# Patient Record
Sex: FEMALE | Employment: UNEMPLOYED | URBAN - METROPOLITAN AREA
[De-identification: names, ages, dates, MRNs, and addresses within clinical notes are randomized per-mention and may not be internally consistent; named-entity substitution may affect disease eponyms.]

---

## 2017-11-18 ENCOUNTER — HOSPITAL ENCOUNTER (OUTPATIENT)
Dept: CT IMAGING | Age: 57
Discharge: HOME | End: 2017-11-18
Attending: SPECIALIST
Payer: COMMERCIAL

## 2017-11-18 DIAGNOSIS — A04.9 BACTERIAL ENTERITIS: ICD-10-CM

## 2017-11-18 DIAGNOSIS — Z86.010 HX OF COLONIC POLYPS: ICD-10-CM

## 2017-11-18 DIAGNOSIS — R14.0 BLOATING SYMPTOM: ICD-10-CM

## 2017-11-18 DIAGNOSIS — K59.00 CONSTIPATION: ICD-10-CM

## 2017-11-18 DIAGNOSIS — R10.31 ABDOMINAL PAIN, RIGHT LOWER QUADRANT: ICD-10-CM

## 2017-11-18 PROCEDURE — 74011636320 HC RX REV CODE- 636/320: Performed by: SPECIALIST

## 2017-11-18 PROCEDURE — 74177 CT ABD & PELVIS W/CONTRAST: CPT

## 2017-11-18 PROCEDURE — 74011000258 HC RX REV CODE- 258: Performed by: SPECIALIST

## 2017-11-18 RX ORDER — SODIUM CHLORIDE 0.9 % (FLUSH) 0.9 %
10 SYRINGE (ML) INJECTION
Status: COMPLETED | OUTPATIENT
Start: 2017-11-18 | End: 2017-11-18

## 2017-11-18 RX ADMIN — Medication 10 ML: at 09:32

## 2017-11-18 RX ADMIN — IOPAMIDOL 100 ML: 755 INJECTION, SOLUTION INTRAVENOUS at 09:32

## 2017-11-18 RX ADMIN — SODIUM CHLORIDE 100 ML: 900 INJECTION, SOLUTION INTRAVENOUS at 09:32

## 2018-01-29 ENCOUNTER — HOSPITAL ENCOUNTER (EMERGENCY)
Age: 58
Discharge: HOME | End: 2018-01-29
Attending: FAMILY MEDICINE

## 2018-01-29 VITALS
BODY MASS INDEX: 26.87 KG/M2 | OXYGEN SATURATION: 99 % | WEIGHT: 146 LBS | RESPIRATION RATE: 20 BRPM | HEIGHT: 62 IN | SYSTOLIC BLOOD PRESSURE: 101 MMHG | HEART RATE: 102 BPM | DIASTOLIC BLOOD PRESSURE: 60 MMHG | TEMPERATURE: 99.4 F

## 2018-01-29 DIAGNOSIS — J06.9 VIRAL URI WITH COUGH: Primary | ICD-10-CM

## 2018-01-29 LAB
INFLUENZA A AG (POC): NORMAL
INFLUENZA AG (POC) NEGATIVE CTRL.: NORMAL
INFLUENZA AG (POC) POSITIVE CTRL.: NORMAL
INFLUENZA B AG (POC): NORMAL
LOT NUMBER POC: NORMAL

## 2018-01-29 RX ORDER — PREDNISONE 5 MG/1
TABLET ORAL
Qty: 21 TAB | Refills: 0 | Status: SHIPPED | OUTPATIENT
Start: 2018-01-29 | End: 2019-12-21

## 2018-01-29 RX ORDER — ROSUVASTATIN CALCIUM 20 MG/1
20 TABLET, COATED ORAL
COMMUNITY
End: 2019-12-21

## 2018-01-29 RX ORDER — PROMETHAZINE HYDROCHLORIDE AND DEXTROMETHORPHAN HYDROBROMIDE 6.25; 15 MG/5ML; MG/5ML
5 SYRUP ORAL
Qty: 100 ML | Refills: 0 | Status: SHIPPED | OUTPATIENT
Start: 2018-01-29 | End: 2019-12-21

## 2018-01-29 RX ORDER — BENZONATATE 200 MG/1
200 CAPSULE ORAL
Qty: 21 CAP | Refills: 0 | Status: SHIPPED | OUTPATIENT
Start: 2018-01-29 | End: 2018-02-05

## 2018-01-29 NOTE — UC PROVIDER NOTE
Patient is a 62 y.o. female presenting with cough. The history is provided by the patient. Cough   This is a new problem. The current episode started more than 2 days ago. The problem occurs every few minutes. The problem has not changed since onset. The cough is non-productive. There has been no fever. Pertinent negatives include no chest pain, no chills, no myalgias, no shortness of breath and no wheezing. She has tried nothing for the symptoms. She is not a smoker. Her past medical history is significant for bronchitis. Her past medical history does not include asthma. History reviewed. No pertinent past medical history. Past Surgical History:   Procedure Laterality Date    HX  SECTION      x3    HX GYN      ablation    HX TONSILLECTOMY           History reviewed. No pertinent family history. Social History     Social History    Marital status: SINGLE     Spouse name: N/A    Number of children: N/A    Years of education: N/A     Occupational History    Not on file. Social History Main Topics    Smoking status: Former Smoker    Smokeless tobacco: Never Used      Comment: quit Aug 2014    Alcohol use Yes      Comment: rare    Drug use: Not on file    Sexual activity: Not on file     Other Topics Concern    Not on file     Social History Narrative                ALLERGIES: Venom-honey bee; Ciprofloxacin; Penicillins; and Sulfa (sulfonamide antibiotics)    Review of Systems   Constitutional: Negative for chills. Respiratory: Positive for cough. Negative for shortness of breath and wheezing. Cardiovascular: Negative for chest pain. Musculoskeletal: Negative for myalgias. All other systems reviewed and are negative. Vitals:    18 0834   BP: 101/60   Pulse: (!) 102   Resp: 20   Temp: 99.4 °F (37.4 °C)   SpO2: 99%   Weight: 66.2 kg (146 lb)   Height: 5' 2\" (1.575 m)       Physical Exam   Constitutional: No distress.    HENT:   Right Ear: Tympanic membrane and ear canal normal.   Left Ear: Tympanic membrane and ear canal normal.   Nose: Nose normal.   Mouth/Throat: No oropharyngeal exudate, posterior oropharyngeal edema or posterior oropharyngeal erythema. Eyes: Conjunctivae are normal. Right eye exhibits no discharge. Left eye exhibits no discharge. Neck: Neck supple. Pulmonary/Chest: Effort normal and breath sounds normal. No respiratory distress. She has no wheezes. She has no rales. Lymphadenopathy:     She has no cervical adenopathy. Skin: No rash noted. Nursing note and vitals reviewed. MDM     Differential Diagnosis; Clinical Impression; Plan:     CLINICAL IMPRESSION:  Viral URI with cough  (primary encounter diagnosis)      DDX    Plan:    Rapid flu- negative  Use Mucinex DM twice a day  Tessalon- prednisone- promethazine DM   Amount and/or Complexity of Data Reviewed:   Clinical lab tests:  Ordered and reviewed   Review and summarize past medical records:  Yes  Risk of Significant Complications, Morbidity, and/or Mortality:   Presenting problems: Moderate  Diagnostic procedures: Moderate  Management options:   Moderate  Progress:   Patient progress:  Stable      Procedures

## 2018-01-29 NOTE — DISCHARGE INSTRUCTIONS
Upper Respiratory Infection (Cold): Care Instructions  Your Care Instructions    An upper respiratory infection, or URI, is an infection of the nose, sinuses, or throat. URIs are spread by coughs, sneezes, and direct contact. The common cold is the most frequent kind of URI. The flu and sinus infections are other kinds of URIs. Almost all URIs are caused by viruses. Antibiotics won't cure them. But you can treat most infections with home care. This may include drinking lots of fluids and taking over-the-counter pain medicine. You will probably feel better in 4 to 10 days. The doctor has checked you carefully, but problems can develop later. If you notice any problems or new symptoms, get medical treatment right away. Follow-up care is a key part of your treatment and safety. Be sure to make and go to all appointments, and call your doctor if you are having problems. It's also a good idea to know your test results and keep a list of the medicines you take. How can you care for yourself at home? · To prevent dehydration, drink plenty of fluids, enough so that your urine is light yellow or clear like water. Choose water and other caffeine-free clear liquids until you feel better. If you have kidney, heart, or liver disease and have to limit fluids, talk with your doctor before you increase the amount of fluids you drink. · Take an over-the-counter pain medicine, such as acetaminophen (Tylenol), ibuprofen (Advil, Motrin), or naproxen (Aleve). Read and follow all instructions on the label. · Before you use cough and cold medicines, check the label. These medicines may not be safe for young children or for people with certain health problems. · Be careful when taking over-the-counter cold or flu medicines and Tylenol at the same time. Many of these medicines have acetaminophen, which is Tylenol. Read the labels to make sure that you are not taking more than the recommended dose.  Too much acetaminophen (Tylenol) can be harmful. · Get plenty of rest.  · Do not smoke or allow others to smoke around you. If you need help quitting, talk to your doctor about stop-smoking programs and medicines. These can increase your chances of quitting for good. When should you call for help? Call 911 anytime you think you may need emergency care. For example, call if:  ? · You have severe trouble breathing. ?Call your doctor now or seek immediate medical care if:  ? · You seem to be getting much sicker. ? · You have new or worse trouble breathing. ? · You have a new or higher fever. ? · You have a new rash. ? Watch closely for changes in your health, and be sure to contact your doctor if:  ? · You have a new symptom, such as a sore throat, an earache, or sinus pain. ? · You cough more deeply or more often, especially if you notice more mucus or a change in the color of your mucus. ? · You do not get better as expected. Where can you learn more? Go to http://arabella-akila.info/. Enter F529 in the search box to learn more about \"Upper Respiratory Infection (Cold): Care Instructions. \"  Current as of: May 12, 2017  Content Version: 11.4  © 9418-1045 [employer]. Care instructions adapted under license by [employer] (which disclaims liability or warranty for this information). If you have questions about a medical condition or this instruction, always ask your healthcare professional. [place] [address] any warranty or liability for your use of this information.

## 2019-09-05 ENCOUNTER — HOSPITAL ENCOUNTER (OUTPATIENT)
Dept: GENERAL RADIOLOGY | Age: 59
Discharge: HOME | End: 2019-09-05
Attending: SPECIALIST
Payer: COMMERCIAL

## 2019-09-05 DIAGNOSIS — R14.0 BLOATING SYMPTOM: ICD-10-CM

## 2019-09-05 DIAGNOSIS — K59.00 CONSTIPATION: ICD-10-CM

## 2019-09-05 PROCEDURE — 74241 XR UPPER GI W KUB/ BA SWALLOW: CPT

## 2019-09-11 ENCOUNTER — HOSPITAL ENCOUNTER (OUTPATIENT)
Dept: NUCLEAR MEDICINE | Age: 59
Discharge: HOME | End: 2019-09-11
Attending: SPECIALIST
Payer: COMMERCIAL

## 2019-09-11 DIAGNOSIS — R14.0 BLOATING SYMPTOM: ICD-10-CM

## 2019-09-11 DIAGNOSIS — K59.00 CONSTIPATION: ICD-10-CM

## 2019-09-11 PROCEDURE — 78264 GASTRIC EMPTYING IMG STUDY: CPT

## 2019-12-21 ENCOUNTER — OFFICE VISIT (OUTPATIENT)
Dept: URGENT CARE | Age: 59
End: 2019-12-21

## 2019-12-21 VITALS
HEART RATE: 100 BPM | OXYGEN SATURATION: 97 % | BODY MASS INDEX: 28.16 KG/M2 | TEMPERATURE: 98.8 F | DIASTOLIC BLOOD PRESSURE: 59 MMHG | WEIGHT: 153 LBS | SYSTOLIC BLOOD PRESSURE: 126 MMHG | HEIGHT: 62 IN | RESPIRATION RATE: 18 BRPM

## 2019-12-21 DIAGNOSIS — R50.9 FEVER, UNSPECIFIED FEVER CAUSE: ICD-10-CM

## 2019-12-21 DIAGNOSIS — B96.89 BACTERIAL SINUSITIS: ICD-10-CM

## 2019-12-21 DIAGNOSIS — J32.9 BACTERIAL SINUSITIS: ICD-10-CM

## 2019-12-21 LAB
FLUAV+FLUBV AG NOSE QL IA.RAPID: NEGATIVE POS/NEG
FLUAV+FLUBV AG NOSE QL IA.RAPID: NEGATIVE POS/NEG
VALID INTERNAL CONTROL?: YES

## 2019-12-21 RX ORDER — AZITHROMYCIN 250 MG/1
TABLET, FILM COATED ORAL
Qty: 6 TAB | Refills: 0 | Status: SHIPPED | OUTPATIENT
Start: 2019-12-21 | End: 2019-12-26

## 2019-12-21 RX ORDER — LEVOTHYROXINE SODIUM 25 UG/1
TABLET ORAL
COMMUNITY
End: 2021-05-14

## 2019-12-21 NOTE — PATIENT INSTRUCTIONS

## 2019-12-21 NOTE — PROGRESS NOTES
The history is provided by the patient. Cold Symptoms   The history is provided by the patient. This is a new problem. The current episode started more than 1 week ago. The problem occurs constantly. The problem has not changed since onset. The cough is non-productive. There has been no fever. Fever duration: Patient states earlier part of the week she had low grade temperature  Associated symptoms include headaches and rhinorrhea. Pertinent negatives include no chest pain, no chills, no eye redness, no ear pain, no sore throat, no shortness of breath, no wheezing, no nausea and no vomiting. Ear congestion: bilateral. She has tried cough syrup for the symptoms. The treatment provided no relief. History reviewed. No pertinent past medical history. Past Surgical History:   Procedure Laterality Date    HX  SECTION      x3    HX GYN      ablation    HX TONSILLECTOMY           History reviewed. No pertinent family history.      Social History     Socioeconomic History    Marital status:      Spouse name: Not on file    Number of children: Not on file    Years of education: Not on file    Highest education level: Not on file   Occupational History    Not on file   Social Needs    Financial resource strain: Not on file    Food insecurity:     Worry: Not on file     Inability: Not on file    Transportation needs:     Medical: Not on file     Non-medical: Not on file   Tobacco Use    Smoking status: Former Smoker    Smokeless tobacco: Never Used    Tobacco comment: quit Aug 2014   Substance and Sexual Activity    Alcohol use: Yes     Comment: rare    Drug use: Not on file    Sexual activity: Not on file   Lifestyle    Physical activity:     Days per week: Not on file     Minutes per session: Not on file    Stress: Not on file   Relationships    Social connections:     Talks on phone: Not on file     Gets together: Not on file     Attends Caodaism service: Not on file     Active member of club or organization: Not on file     Attends meetings of clubs or organizations: Not on file     Relationship status: Not on file    Intimate partner violence:     Fear of current or ex partner: Not on file     Emotionally abused: Not on file     Physically abused: Not on file     Forced sexual activity: Not on file   Other Topics Concern    Not on file   Social History Narrative    Not on file                ALLERGIES: Venom-honey bee; Ciprofloxacin; Penicillins; and Sulfa (sulfonamide antibiotics)    Review of Systems   Constitutional: Positive for fatigue. Negative for chills and fever. HENT: Positive for congestion, ear discharge, postnasal drip, rhinorrhea and sinus pressure. Negative for ear pain, hearing loss, sinus pain, sore throat, trouble swallowing and voice change. Eyes: Negative. Negative for redness. Respiratory: Negative for cough, shortness of breath and wheezing. Cardiovascular: Negative for chest pain. Gastrointestinal: Negative for nausea and vomiting. Genitourinary: Negative. Musculoskeletal: Negative. Skin: Negative. Neurological: Positive for headaches. Negative for dizziness, syncope, weakness and light-headedness. Vitals:    12/21/19 0947   BP: 126/59   Pulse: 100   Resp: 18   Temp: 98.8 °F (37.1 °C)   SpO2: 97%   Weight: 153 lb (69.4 kg)   Height: 5' 2\" (1.575 m)       Physical Exam  Constitutional:       Appearance: Normal appearance. She is well-developed. HENT:      Head:      Comments: Maxillary and ethmoid sinus pressure upon palpation     Right Ear: Tympanic membrane normal.      Left Ear: Tympanic membrane normal.      Nose: Congestion and rhinorrhea present. Mouth/Throat:      Comments: Post nasal drainage  Eyes:      Conjunctiva/sclera: Conjunctivae normal.      Pupils: Pupils are equal, round, and reactive to light. Neck:      Musculoskeletal: Normal range of motion.    Cardiovascular:      Rate and Rhythm: Normal rate and regular rhythm. Heart sounds: Normal heart sounds. Pulmonary:      Effort: Pulmonary effort is normal.      Breath sounds: Normal breath sounds. Musculoskeletal: Normal range of motion. Lymphadenopathy:      Cervical: No cervical adenopathy. Skin:     General: Skin is warm and dry. Neurological:      Mental Status: She is alert and oriented to person, place, and time. MDM     Differential Diagnosis; Clinical Impression; Plan:     (J32.9,  B96.89) Bacterial sinusitis  (R50.9) Fever, unspecified fever cause  Orders Placed This Encounter      azithromycin (ZITHROMAX) 250 mg tablet          Sig: Take 2 tablets today, then take 1 tablet daily          Dispense:  6 Tab          Refill:  0    Advised patient to use OTC nasal saline spray, use a decongestant, a daily antihistamine and increase water intake. The patients condition was discussed with the patient and they understand. The patient is to follow up with PCP. If signs and symptoms become worse the pt is to go to the ER. The patient is to take medications as prescribed. AVS given with patient instructions upon discharge.                   Procedures

## 2019-12-31 ENCOUNTER — OFFICE VISIT (OUTPATIENT)
Dept: URGENT CARE | Age: 59
End: 2019-12-31

## 2019-12-31 VITALS
HEIGHT: 62 IN | HEART RATE: 95 BPM | SYSTOLIC BLOOD PRESSURE: 127 MMHG | RESPIRATION RATE: 18 BRPM | WEIGHT: 153.4 LBS | DIASTOLIC BLOOD PRESSURE: 60 MMHG | OXYGEN SATURATION: 97 % | BODY MASS INDEX: 28.23 KG/M2 | TEMPERATURE: 98.5 F

## 2019-12-31 DIAGNOSIS — B96.89 BACTERIAL SINUSITIS: ICD-10-CM

## 2019-12-31 DIAGNOSIS — R05.9 COUGH: ICD-10-CM

## 2019-12-31 DIAGNOSIS — R50.9 FEVER, UNSPECIFIED FEVER CAUSE: ICD-10-CM

## 2019-12-31 DIAGNOSIS — J32.9 BACTERIAL SINUSITIS: ICD-10-CM

## 2019-12-31 RX ORDER — DOXYCYCLINE 100 MG/1
100 TABLET ORAL 2 TIMES DAILY
Qty: 14 TAB | Refills: 0 | Status: SHIPPED | OUTPATIENT
Start: 2019-12-31 | End: 2020-01-07

## 2019-12-31 RX ORDER — HYDROCODONE POLISTIREX AND CHLORPHENIRAMINE POLISTIREX 10; 8 MG/5ML; MG/5ML
1 SUSPENSION, EXTENDED RELEASE ORAL
Qty: 50 ML | Refills: 0 | Status: SHIPPED | OUTPATIENT
Start: 2019-12-31 | End: 2020-01-05

## 2019-12-31 NOTE — PROGRESS NOTES
The history is provided by the patient. Cold Symptoms   The history is provided by the patient. This is a recurrent problem. The current episode started more than 1 week ago. The problem occurs constantly. The problem has not changed since onset. The cough is non-productive. There has been no fever. Associated symptoms include ear pain, headaches and rhinorrhea. Pertinent negatives include no chest pain, no chills, no sore throat, no shortness of breath, no wheezing, no nausea and no vomiting. She has tried prescription drugs for the symptoms. The treatment provided no relief. [facility]. Patient states symptoms got better for a day and symptoms came back even worst.  Patient denies chest pain, SOB or dizziness. History reviewed. No pertinent past medical history. Past Surgical History:   Procedure Laterality Date    HX  SECTION      x3    HX GYN      ablation    HX TONSILLECTOMY           History reviewed. No pertinent family history.      Social History     Socioeconomic History    Marital status:      Spouse name: Not on file    Number of children: Not on file    Years of education: Not on file    Highest education level: Not on file   Occupational History    Not on file   Social Needs    Financial resource strain: Not on file    Food insecurity:     Worry: Not on file     Inability: Not on file    Transportation needs:     Medical: Not on file     Non-medical: Not on file   Tobacco Use    Smoking status: Former Smoker    Smokeless tobacco: Never Used    Tobacco comment: quit Aug 2014   Substance and Sexual Activity    Alcohol use: Yes     Comment: rare    Drug use: Not on file    Sexual activity: Not on file   Lifestyle    Physical activity:     Days per week: Not on file     Minutes per session: Not on file    Stress: Not on file   Relationships    Social connections:     Talks on phone: Not on file     Gets together: Not on file     Attends Sabianism service: Not on file     Active member of club or organization: Not on file     Attends meetings of clubs or organizations: Not on file     Relationship status: Not on file    Intimate partner violence:     Fear of current or ex partner: Not on file     Emotionally abused: Not on file     Physically abused: Not on file     Forced sexual activity: Not on file   Other Topics Concern    Not on file   Social History Narrative    Not on file                ALLERGIES: Venom-honey bee; Ciprofloxacin; Penicillins; and Sulfa (sulfonamide antibiotics)    Review of Systems   Constitutional: Negative for chills, fatigue and fever. HENT: Positive for congestion, ear discharge, ear pain, postnasal drip, rhinorrhea and sinus pressure. Negative for hearing loss, sinus pain, sore throat, trouble swallowing and voice change. Eyes: Negative. Respiratory: Positive for cough. Negative for chest tightness, shortness of breath and wheezing. Cardiovascular: Negative for chest pain. Gastrointestinal: Negative for nausea and vomiting. Genitourinary: Negative. Musculoskeletal: Negative. Skin: Negative. Neurological: Positive for headaches. Negative for dizziness, syncope, weakness and light-headedness. Vitals:    12/31/19 1131   BP: 127/60   Pulse: 95   Resp: 18   Temp: 98.5 °F (36.9 °C)   SpO2: 97%   Weight: 153 lb 6.4 oz (69.6 kg)   Height: 5' 2\" (1.575 m)       Physical Exam  Constitutional:       Appearance: She is well-developed. HENT:      Head:      Comments: Maxillary and ethmoid sinus tenderness upon palpation     Right Ear: Tympanic membrane normal.      Left Ear: Tympanic membrane normal.      Nose: Congestion and rhinorrhea present. Mouth/Throat:      Pharynx: No oropharyngeal exudate or posterior oropharyngeal erythema.       Comments: Post nasal drainage  Eyes:      Conjunctiva/sclera: Conjunctivae normal.      Pupils: Pupils are equal, round, and reactive to light. Neck:      Musculoskeletal: Normal range of motion. Cardiovascular:      Rate and Rhythm: Normal rate and regular rhythm. Heart sounds: Normal heart sounds. Pulmonary:      Effort: Pulmonary effort is normal.      Breath sounds: Normal breath sounds. Musculoskeletal: Normal range of motion. Lymphadenopathy:      Cervical: No cervical adenopathy. Skin:     General: Skin is warm and dry. Neurological:      Mental Status: She is alert and oriented to person, place, and time. MDM     Differential Diagnosis; Clinical Impression; Plan:     (J32.9,  B96.89) Bacterial sinusitis  (R50.9) Fever, unspecified fever cause  (R05) Cough  Orders Placed This Encounter      doxycycline (ADOXA) 100 mg tablet          Sig: Take 1 Tab by mouth two (2) times a day for 7 days. Dispense:  14 Tab          Refill:  0      chlorpheniramine-HYDROcodone (TUSSIONEX) 10-8 mg/5 mL suspension          Sig: Take 5 mL by mouth every twelve (12) hours as needed for Cough or Congestion for up to 5 days. Max Daily Amount: 10 mL. Dispense:  50 mL          Refill:  0    The patients condition was discussed with the patient and they understand. The patient is to follow up with PCP. If signs and symptoms become worse the pt is to go to the ER. The patient is to take medications as prescribed. AVS given with patient instructions upon discharge.                   Procedures

## 2019-12-31 NOTE — PATIENT INSTRUCTIONS
Sinusitis: Care Instructions  Your Care Instructions    Sinusitis is an infection of the lining of the sinus cavities in your head. Sinusitis often follows a cold. It causes pain and pressure in your head and face. In most cases, sinusitis gets better on its own in 1 to 2 weeks. But some mild symptoms may last for several weeks. Sometimes antibiotics are needed. Follow-up care is a key part of your treatment and safety. Be sure to make and go to all appointments, and call your doctor if you are having problems. It's also a good idea to know your test results and keep a list of the medicines you take. How can you care for yourself at home? · Take an over-the-counter pain medicine, such as acetaminophen (Tylenol), ibuprofen (Advil, Motrin), or naproxen (Aleve). Read and follow all instructions on the label. · If the doctor prescribed antibiotics, take them as directed. Do not stop taking them just because you feel better. You need to take the full course of antibiotics. · Be careful when taking over-the-counter cold or flu medicines and Tylenol at the same time. Many of these medicines have acetaminophen, which is Tylenol. Read the labels to make sure that you are not taking more than the recommended dose. Too much acetaminophen (Tylenol) can be harmful. · Breathe warm, moist air from a steamy shower, a hot bath, or a sink filled with hot water. Avoid cold, dry air. Using a humidifier in your home may help. Follow the directions for cleaning the machine. · Use saline (saltwater) nasal washes to help keep your nasal passages open and wash out mucus and bacteria. You can buy saline nose drops at a grocery store or drugstore. Or you can make your own at home by adding 1 teaspoon of salt and 1 teaspoon of baking soda to 2 cups of distilled water. If you make your own, fill a bulb syringe with the solution, insert the tip into your nostril, and squeeze gently. Elisa Wanda your nose.   · Put a hot, wet towel or a warm gel pack on your face 3 or 4 times a day for 5 to 10 minutes each time. · Try a decongestant nasal spray like oxymetazoline (Afrin). Do not use it for more than 3 days in a row. Using it for more than 3 days can make your congestion worse. When should you call for help? Call your doctor now or seek immediate medical care if:    · You have new or worse swelling or redness in your face or around your eyes.     · You have a new or higher fever.    Watch closely for changes in your health, and be sure to contact your doctor if:    · You have new or worse facial pain.     · The mucus from your nose becomes thicker (like pus) or has new blood in it.     · You are not getting better as expected. Where can you learn more? Go to http://arabella-akila.info/. Enter K965 in the search box to learn more about \"Sinusitis: Care Instructions. \"  Current as of: October 21, 2018  Content Version: 12.2  © 6360-8112 [employer]. Care instructions adapted under license by [employer] (which disclaims liability or warranty for this information). If you have questions about a medical condition or this instruction, always ask your healthcare professional. [place] [address] any warranty or liability for your use of this information.

## 2021-05-14 ENCOUNTER — OFFICE VISIT (OUTPATIENT)
Dept: ENDOCRINOLOGY | Age: 61
End: 2021-05-14
Payer: COMMERCIAL

## 2021-05-14 VITALS
SYSTOLIC BLOOD PRESSURE: 118 MMHG | BODY MASS INDEX: 29.08 KG/M2 | DIASTOLIC BLOOD PRESSURE: 75 MMHG | HEART RATE: 78 BPM | WEIGHT: 159 LBS

## 2021-05-14 DIAGNOSIS — E03.9 ACQUIRED HYPOTHYROIDISM: Primary | ICD-10-CM

## 2021-05-14 PROCEDURE — 99204 OFFICE O/P NEW MOD 45 MIN: CPT | Performed by: INTERNAL MEDICINE

## 2021-05-14 RX ORDER — LEVOTHYROXINE, LIOTHYRONINE 19; 4.5 UG/1; UG/1
TABLET ORAL
COMMUNITY
Start: 2021-04-20 | End: 2021-05-15 | Stop reason: ALTCHOICE

## 2021-05-14 RX ORDER — ALIROCUMAB 150 MG/ML
INJECTION, SOLUTION SUBCUTANEOUS EVERY 2 WEEKS
COMMUNITY
Start: 2021-05-06

## 2021-05-14 NOTE — PROGRESS NOTES
Chief Complaint   Patient presents with    Thyroid Problem     History of Present Illness: Ruby Spears is a 64 y.o. female who is a new patient for thyroid. Was having fatigue and difficultly losing weight back in 2018 and was checked by her PCP and was found to have hypothyroidism. Was started on levothyroxine and also took brand synthroid as initial treatments but continued to have fatigue on these and decided to see an endocrinologist and went to Dr. Jaime Sheets in 2019 and was changed to unithroid initially but still had fatigue so asked to change to natural thyroid hormone and initially was on [facility] thyroid but this was recalled in 10/20 and then changed to NP thyroid at that time. Had been taking 30 mg 1 tab 5x/week and 2 tabs 2x/week until her labs were drawn on 21 that showed her TSH was 4.59 and her dose was increased to 1 tab 4x/week and 2 tabs 3x/week. Takes the NP thyroid first thing in the morning with just water and waits at least 30 minutes before eating and coffee. Not on any vitamins or PPI that would interfere. Doesn't miss her doses. Is starting to have a little more energy on this dose but does still have some fatigue intermittently. Can alternate between regular and some constipation. Tends to stay hot most of the time. No hair loss. Has had some brittle nails that are improving. Weight has been stable recently. Doesn't recall being told she had Hashimoto's. No anterior neck pain or swelling. Does have some intermittent moodiness. Does see Dr. Sheyla Whitaker for her high cholesterol and has tried and failed 3 statins and she prescribed praluent and is tolerating this.        Past Medical History:   Diagnosis Date    Acquired hypothyroidism 2018    High cholesterol     Sleep apnea     on CPAP     Past Surgical History:   Procedure Laterality Date    HX  SECTION      x3    HX GYN      uterine ablation    HX TONSILLECTOMY       Current Outpatient Medications   Medication Shani Corrales NP Thyroid 30 mg tablet 1 tab 4x/week and 2 tabs 3x/week    Praluent Pen 150 mg/mL injector pen Once every 2 weeks. No current facility-administered medications for this visit.       Allergies   Allergen Reactions    Venom-Honey Bee Anaphylaxis    Ciprofloxacin Hives, Rash and Itching    Penicillins Hives, Rash and Itching    Statins-Hmg-Coa Reductase Inhibitors Myalgia     With lovastatin and crestor and simvastatin    Sulfa (Sulfonamide Antibiotics) Hives, Rash and Itching     Family History   Problem Relation Age of Onset    Thyroid Disease Mother     Pneumonia Father         and had c-diff    Thyroid Disease Sister      Social History     Socioeconomic History    Marital status:      Spouse name: Not on file    Number of children: Not on file    Years of education: Not on file    Highest education level: Not on file   Occupational History    Not on file   Social Needs    Financial resource strain: Not on file    Food insecurity     Worry: Not on file     Inability: Not on file    Transportation needs     Medical: Not on file     Non-medical: Not on file   Tobacco Use    Smoking status: Former Smoker     Packs/day: 0.50     Years: 20.00     Pack years: 10.00     Quit date: 2014     Years since quittin.7    Smokeless tobacco: Never Used   Substance and Sexual Activity    Alcohol use: Not Currently    Drug use: Never    Sexual activity: Not on file   Lifestyle    Physical activity     Days per week: Not on file     Minutes per session: Not on file    Stress: Not on file   Relationships    Social connections     Talks on phone: Not on file     Gets together: Not on file     Attends Denominational service: Not on file     Active member of club or organization: Not on file     Attends meetings of clubs or organizations: Not on file     Relationship status: Not on file    Intimate partner violence     Fear of current or ex partner: Not on file     Emotionally abused: Not on file     Physically abused: Not on file     Forced sexual activity: Not on file   Other Topics Concern    Not on file   Social History Narrative    Lives in [place] with  from 2nd marriage. Has 3 sons from her 1st marriage. Works an  for [employer]. Likes to garden and spend time with her grandkids (has 6 of them by her family and 2 by her current 's). Review of Systems: per HPI    Physical Examination:  Blood pressure 118/75, pulse 78, weight 159 lb (72.1 kg). - General: pleasant, no distress, good eye contact  - HEENT: no exopthalmos, no periorbital edema, no scleral/conjunctival injection, EOMI, no lid lag or stare  - Neck: supple, small rubbery goiter without nodules, no masses, lymph nodes, or carotid bruits, no supraclavicular or dorsocervical fat pads  - Cardiovascular: regular, normal rate, normal S1 and S2, no murmurs/rubs/gallops   - Respiratory: clear to auscultation bilaterally  - Gastrointestinal: soft, nontender, nondistended, no masses, no hepatosplenomegaly  - Musculoskeletal: no proximal muscle weakness in upper or lower extremities  - Integumentary: no acanthosis nigricans, no abdominal striae, no rashes, no edema  - Neurological: reflexes 2+ at biceps, no tremor  - Psychiatric: normal mood and affect    Data Reviewed:   - labs as above    Assessment/Plan:   1. Acquired hypothyroidism: Was having fatigue and difficultly losing weight back in 2018 and was checked by her PCP and was found to have hypothyroidism. Was started on levothyroxine and also took brand synthroid as initial treatments but continued to have fatigue on these and decided to see an endocrinologist and went to Dr. Lisa Howell in 2019 and was changed to unithroid initially but still had fatigue so asked to change to natural thyroid hormone and initially was on [facility] thyroid but this was recalled in 10/20 and then changed to NP thyroid at that time.   Had been taking 30 mg 1 tab 5x/week and 2 tabs 2x/week until her labs were drawn on 4/12/21 that showed her TSH was 4.59 and her dose was increased to 1 tab 4x/week and 2 tabs 3x/week. - cont NP thyroid 30 mg 1 tab 4x/week and 2 tabs 3x/week until labs are back  - check TSH, free T4, and total T3 today, in 2 months and prior to next visit  - check thyroid antibody panel today to screen for hashimoto's        Patient Instructions   1) Please call [phone] to reset your [employer] password if you can't get in so I can send you your results through the portal.    2) I will repeat your thyroid tests to see if you need a change in your dose of NP thyroid and I will check you for Hashimoto's with thyroid antibody levels. 3) I put an order directly into the [email or web address] system to repeat your labs in the 1-2 weeks prior to your next visit so just ask for the order under my name and you will receive a courtesy reminder through [employer] to have these drawn. Follow-up and Dispositions    · Return 11/19/21 at 11:50am.             Copy sent to:  Christina Mejia NP (Nurse Practitioner)  Chula Fernando MD (Cardiology)    Lab follow up: 5/15/21  Component      Latest Ref Rng & Units 5/14/2021 5/14/2021 5/14/2021 5/14/2021          12:24 PM 12:24 PM 12:24 PM 12:24 PM   Thyroid peroxidase Ab      0 - 34 IU/mL 254 (H)      Thyroglobulin Ab      0.0 - 0.9 IU/mL 9.8 (H)      T4, Free      0.82 - 1.77 ng/dL    0.88   TSH      0.450 - 4.500 uIU/mL   3.220    T3, total      71 - 180 ng/dL  113       Sent her the following message through [employer]:  TSH is a thyroid test.  Your level is 3.22 which is normal but still above goal of 0.45-2.0. This test goes opposite of your thyroid dose and suggests your dose of NP thyroid is not enough.   I will increase your dose to 60 mg tabs but have you take 1 tab 6 days a week and skip one day a week which is the equivalent of 51 mg/day up from your current dose of 43 mg/day and have sent a new prescription to your local pharmacy.  -------------------------------------------------------------------------------------------------------------------  Your thyroid peroxidase (TPO) antibody and thyroglobulin antibody levels were both elevated which indicates that you do have an autoimmune thyroid disease called Hashimoto's disease as the cause of your hypothyroidism and since this condition can be genetic, you should be sure to let your children know that they could be at risk of a thyroid condition in the future.  -------------------------------------------------------------------------------------------------------------------  I put an order directly into the [employer] system to repeat your labs in 6 weeks so just ask for the order under my name and you will receive a courtesy reminder through [employer] to have these drawn and I'll be in touch with the results. Lab follow up: 7/2/21  Component      Latest Ref Rng & Units 7/1/2021 7/1/2021 7/1/2021           9:23 AM  9:23 AM  9:23 AM   T4, Free      0.82 - 1.77 ng/dL   0.89   TSH      0.450 - 4.500 uIU/mL  2.820    T3, total      71 - 180 ng/dL 179       Sent her the following message through [employer]:  TSH is a thyroid test.  Your level is 2.82 which is normal and down from 3.22 but still above goal of 0.45-2.0. This test goes opposite of your thyroid dose and suggests your dose of NP thyroid is not enough or you have missed doses in the past 6 weeks. Please confirm you are taking 60 mg 6 days/week and whether you have missed any doses and I'll determine a plan. Addendum: 7/2/21 9:48 AM    We had the following mychart exchange:    Ok. Then let's increase your dose to 1 tab 7 days a week. I'm not concerned that your T3 is higher as this should happen when you increase your dose of NP thyroid and you also had your labs drawn earlier in the day this time which will cause a higher T3 level as the level in the blood is higher the closer the lab draw is to taking the pill.   I updated your prescription at the pharmacy with the new dose. I put an order directly into the [employer] system to repeat your labs in 6 weeks so just ask for the order under my name and you will receive a courtesy reminder through [employer] to have these drawn and I'll be in touch with the results.    ===View-only below this line===      ----- Message -----       From:Ayanna Guerra       Sent:7/2/2021  8:57 AM EDT         To:Sj Sanchez MD    Subject:RE: lab results    Hello, I am taking the NP Thyroid as prescribed and have not missed any doses. Lab follow up: 10/10/21  Received labs from PCP from 9/22/21:  - TSH 1.445    Sent her the following message through [employer]:  I received your labs from your PCP from 9/22/21. TSH is a thyroid test.  Your level is 1.44 which is normal and at goal of 0.45-2.0. This test goes opposite of your thyroid dose and suggests your dose of NP thyroid is perfect so I will keep your dose the same.   Plan on repeating your full panel of labs prior to your visit in November as your PCP did not draw these

## 2021-05-15 LAB
T3 SERPL-MCNC: 113 NG/DL (ref 71–180)
T4 FREE SERPL-MCNC: 0.88 NG/DL (ref 0.82–1.77)
THYROGLOB AB SERPL-ACNC: 9.8 IU/ML (ref 0–0.9)
THYROPEROXIDASE AB SERPL-ACNC: 254 IU/ML (ref 0–34)
TSH SERPL DL<=0.005 MIU/L-ACNC: 3.22 UIU/ML (ref 0.45–4.5)

## 2021-05-15 RX ORDER — LEVOTHYROXINE, LIOTHYRONINE 38; 9 UG/1; UG/1
TABLET ORAL
Qty: 78 TAB | Refills: 3 | Status: SHIPPED | OUTPATIENT
Start: 2021-05-15 | End: 2021-07-02

## 2021-06-26 DIAGNOSIS — E03.9 ACQUIRED HYPOTHYROIDISM: ICD-10-CM

## 2021-07-02 LAB
T3 SERPL-MCNC: 179 NG/DL (ref 71–180)
T4 FREE SERPL-MCNC: 0.89 NG/DL (ref 0.82–1.77)
TSH SERPL DL<=0.005 MIU/L-ACNC: 2.82 UIU/ML (ref 0.45–4.5)

## 2021-07-02 RX ORDER — LEVOTHYROXINE, LIOTHYRONINE 38; 9 UG/1; UG/1
TABLET ORAL
Qty: 90 TABLET | Refills: 3 | Status: SHIPPED | OUTPATIENT
Start: 2021-07-02 | End: 2021-11-19

## 2021-07-02 RX ORDER — LEVOTHYROXINE, LIOTHYRONINE 38; 9 UG/1; UG/1
TABLET ORAL
Qty: 78 TABLET | Refills: 3 | Status: SHIPPED | OUTPATIENT
Start: 2021-07-02 | End: 2021-07-02

## 2021-11-05 DIAGNOSIS — E03.9 ACQUIRED HYPOTHYROIDISM: ICD-10-CM

## 2021-11-13 LAB
T3 SERPL-MCNC: 160 NG/DL (ref 71–180)
T4 FREE SERPL-MCNC: 0.9 NG/DL (ref 0.82–1.77)
TSH SERPL DL<=0.005 MIU/L-ACNC: 1.67 UIU/ML (ref 0.45–4.5)

## 2021-11-19 ENCOUNTER — OFFICE VISIT (OUTPATIENT)
Dept: ENDOCRINOLOGY | Age: 61
End: 2021-11-19
Payer: COMMERCIAL

## 2021-11-19 VITALS
SYSTOLIC BLOOD PRESSURE: 128 MMHG | BODY MASS INDEX: 30.73 KG/M2 | WEIGHT: 167 LBS | DIASTOLIC BLOOD PRESSURE: 67 MMHG | HEART RATE: 96 BPM | HEIGHT: 62 IN

## 2021-11-19 DIAGNOSIS — E03.8 HYPOTHYROIDISM DUE TO HASHIMOTO'S THYROIDITIS: Primary | ICD-10-CM

## 2021-11-19 DIAGNOSIS — E06.3 HYPOTHYROIDISM DUE TO HASHIMOTO'S THYROIDITIS: Primary | ICD-10-CM

## 2021-11-19 DIAGNOSIS — R73.02 IMPAIRED GLUCOSE TOLERANCE: ICD-10-CM

## 2021-11-19 DIAGNOSIS — E78.5 HYPERLIPIDEMIA LDL GOAL <100: ICD-10-CM

## 2021-11-19 PROCEDURE — 99214 OFFICE O/P EST MOD 30 MIN: CPT | Performed by: INTERNAL MEDICINE

## 2021-11-19 RX ORDER — LEVOTHYROXINE, LIOTHYRONINE 38; 9 UG/1; UG/1
TABLET ORAL
Qty: 90 TABLET | Refills: 3
Start: 2021-11-19 | End: 2022-01-03

## 2021-11-19 NOTE — PATIENT INSTRUCTIONS
1) TSH is a thyroid test.  Your level is 1.6 which is normal and at goal of 0.45-2.0. This test goes opposite of your thyroid dose and suggests your dose of NP thyroid is adequate but I will try to increase your dose to 9 tabs/week to try and target a TSH of 0.5-1.0 to see if this better helps your weight and metabolism. I updated your med list but did not send a new prescription to your pharmacy on file that has been filling this med. 2) Hemoglobin A1c is a 3 month marker of your blood sugar control. Normal is less than 5.7% and diabetes is greater than 6.4%. Your Hemoglobin A1c is 6.3% which means your blood sugar is in the borderline diabetic range but hopefully this will come down with the higher thyroid dose and we'll repeat this in 2 months.     3) Your triglycerides (short term fats) can be elevated when your blood sugars are higher so hopefully with better A1c, this value will return to normal.

## 2021-11-19 NOTE — PROGRESS NOTES
Chief Complaint   Patient presents with    Thyroid Problem     pcp and pharmacy confirmed     History of Present Illness: Anali Kaiser is a 64 y.o. female here for follow up of thyroid. She has no first degree relatives with diabetes and her A1c was 6.3% in 9/21 which was the first time this was checked but her random sugar was 97 on the lab draw. Does not drink any sweet drinks. Does not eat after 8pm and feels her stress is managed. Taking the fusion classes at [place] [id number] 3 days a week and doing lifting 2 days a week. Compliant with NP thyroid 7 days a week at 5:30am at least 90 min before food and coffee. Doesn't feel as tired on the higher dose of NP thyroid and bowels are more regular. Nails are doing better and no hair loss. Does have some GERD that she controls with diet and has not needed any meds recently. Will be changing to Dr. Estefania Dean now that Dr. Charley Quinones retired. Still getting praluent every 2 weeks. Current Outpatient Medications   Medication Sig    NP Thyroid 60 mg tablet Take 1 tab daily--New higher quantity replaces prior script on file    Praluent Pen 150 mg/mL injector pen Once every 2 weeks. No current facility-administered medications for this visit. Allergies   Allergen Reactions    Venom-Honey Bee Anaphylaxis    Ciprofloxacin Hives, Rash and Itching    Penicillins Hives, Rash and Itching    Statins-Hmg-Coa Reductase Inhibitors Myalgia     With lovastatin and crestor and simvastatin    Sulfa (Sulfonamide Antibiotics) Hives, Rash and Itching     Review of Systems: PER HPI    Physical Examination:  Blood pressure 128/67, pulse 96, height 5' 2\" (1.575 m), weight 167 lb (75.8 kg).   - General: pleasant, no distress, good eye contact   - Neck: small goiter, no carotid bruits  - Cardiovascular: regular, normal rate, nl s1 and s2, no m/r/g   - Respiratory: clear to auscultation bilaterally   - Integumentary: skin is normal, no edema  - Neurological: reflexes 2+ at biceps, no tremors  - Psychiatric: normal mood and affect    Data Reviewed:   Component      Latest Ref Rng & Units 11/12/2021 11/12/2021 11/12/2021          10:13 AM 10:13 AM 10:13 AM   T4, Free      0.82 - 1.77 ng/dL   0.90   TSH      0.450 - 4.500 uIU/mL  1.670    T3, total      71 - 180 ng/dL 160       Labs from PCP 9/22/21:  - Hgb A1c 6.3%  - lipids: total 117,  HDL 40, , LDL 22  - ALT 27, AST 22  - BUN/Cr 20/1.0  - glucose 97  - TSH 1.445      Assessment/Plan:     1. Acquired hypothyroidism to Hashimoto's: Was having fatigue and difficultly losing weight back in 2018 and was checked by her PCP and was found to have hypothyroidism. Was started on levothyroxine and also took brand synthroid as initial treatments but continued to have fatigue on these and decided to see an endocrinologist and went to Dr. Shantelle Perdomo in 2019 and was changed to unithroid initially but still had fatigue so asked to change to natural thyroid hormone and initially was on [facility] thyroid but this was recalled in 10/20 and then changed to NP thyroid at that time. Had been taking 30 mg 1 tab 5x/week and 2 tabs 2x/week until her labs were drawn on 4/12/21 that showed her TSH was 4.59 and her dose was increased to 1 tab 4x/week and 2 tabs 3x/week. TSH 3.22 and TG ab 9.8 and TPO ab 254 in 5/21 at 1st visit with me and increased to 60 mg 6x/week and TSH 2.82 in 7/21 and increased to 60 mg daily and TSH 1.44 in 9/21 so kept dose the same. TSH up to 1.67 in 11/21 so increased to 9 tabs/week to see if targeting a TSH of 0.5-1.0 helps her weight and energy. - increase NP thyroid to 60 mg on Mon-Fri and 2 tabs on Sat-Sun  - check TSH, free T4, and total T3 in 2 months and prior to next visit          2. Impaired glucose tolerance: her most recent Hgb A1c was 6.3% in 9/21. Will hold on metformin for now but may consider in 2 months if her A1c and weight are not coming down with her NP thyroid dose.   - check A1c and cmp in 2 months and prior to next visit        3. Hyperlipidemia LDL goal <100: LDL 22 and  in 9/21 on praluent 150 mg every 2 weeks. May consider fish oil in the future. - cont praluent 150 mg every 2 weeks  - check lipids in 2 months and prior to next visit          Patient Instructions   1) TSH is a thyroid test.  Your level is 1.6 which is normal and at goal of 0.45-2.0. This test goes opposite of your thyroid dose and suggests your dose of NP thyroid is adequate but I will try to increase your dose to 9 tabs/week to try and target a TSH of 0.5-1.0 to see if this better helps your weight and metabolism. I updated your med list but did not send a new prescription to your pharmacy on file that has been filling this med. 2) Hemoglobin A1c is a 3 month marker of your blood sugar control. Normal is less than 5.7% and diabetes is greater than 6.4%. Your Hemoglobin A1c is 6.3% which means your blood sugar is in the borderline diabetic range but hopefully this will come down with the higher thyroid dose and we'll repeat this in 2 months. 3) Your triglycerides (short term fats) can be elevated when your blood sugars are higher so hopefully with better A1c, this value will return to normal.            Follow-up and Dispositions    · Return in about 6 months (around 5/19/2022).                Copy sent to:  Devendra Amaro NP (Nurse Practitioner)  Evgeny Christian MD (Cardiology)    Lab follow up: 01/21/22  Component      Latest Ref Rng & Units 1/20/2022   Glucose      65 - 99 mg/dL 110 (H)   BUN      8 - 27 mg/dL 21   Creatinine      0.57 - 1.00 mg/dL 0.91   GFR est non-AA      >59 mL/min/1.73 68   GFR est AA      >59 mL/min/1.73 79   BUN/Creatinine ratio      12 - 28 23   Sodium      134 - 144 mmol/L 138   Potassium      3.5 - 5.2 mmol/L 4.4   Chloride      96 - 106 mmol/L 100   CO2      20 - 29 mmol/L 24   Calcium      8.7 - 10.3 mg/dL 9.8   Protein, total      6.0 - 8.5 g/dL 7.1   Albumin      3.8 - 4.8 g/dL 4.6   GLOBULIN, TOTAL      1.5 - 4.5 g/dL 2.5   A-G Ratio      1.2 - 2.2 1.8   Bilirubin, total      0.0 - 1.2 mg/dL 0.9   Alk. phosphatase      44 - 121 IU/L 101   AST      0 - 40 IU/L 24   ALT      0 - 32 IU/L 31   Cholesterol, total      100 - 199 mg/dL 117   Triglyceride      0 - 149 mg/dL 132   HDL Cholesterol      >39 mg/dL 41   VLDL, calculated      5 - 40 mg/dL 23   LDL, calculated      0 - 99 mg/dL 53   Hemoglobin A1c, (calculated)      4.8 - 5.6 % 6.6 (H)   Estimated average glucose      mg/dL 143   TSH      0.450 - 4.500 uIU/mL 0.384 (L)   T4, Free      0.82 - 1.77 ng/dL 1.16   T3, total      71 - 180 ng/dL 235 (H)     Sent her the following message through [employer]:    Hemoglobin A1c is a 3 month marker of your blood sugar control. Normal is less than 5.7% and diabetes is greater than 6.4%. Your Hemoglobin A1c is 6.6% which means your blood sugar is now in the full blown diabetic range and under worse control than last check when your value was 6.3%. Have you not been as careful with your diet over the holidays? Is your weight higher than in November? Would you be willing to start metformin to help decrease your blood sugar and possibly help with weight loss too or do you want to first be more careful with your diet and exercise. Try not to eat more than 45 grams of carbs per meal (1 palm/fist sized serving = 30 grams; carbs are potatoes, rice, pasta, bread, fruit, corn, peas, cereal, desserts). Try to get back into walking 10-15 minutes a day 2-3 times a week and work up to 30 minutes 5 times a week. This does not have to be done altogether but can be split up throughout the day. Let me know when you have a chance. -------------------------------------------------------------------------------------------------------------------  Total Cholesterol is the total number of cholesterol particles in your blood. Goal is less than 200. Triglycerides are the short term fats in your blood. Goal is less than 150. HDL is the good cholesterol in your blood. Goal is more than 50 if you are a woman and 40 if you are a man. LDL is the bad cholesterol in your blood. Goal is less than 100 unless you have a history of heart disease or stroke and then goal is under 70. Your cholesterol is at goal.    Continue to follow a low cholesterol diet. Try to limit the amount of fried foods, fatty foods, butter, gravy, red meat, ice cream, cheese, and eggs in your diet, which are all high in cholesterol.  -------------------------------------------------------------------------------------------------------------------  BUN and creatinine are markers of kidney function. Your values are normal.  -------------------------------------------------------------------------------------------------------------------  ALT and AST are markers of liver function. Your values are normal.  -------------------------------------------------------------------------------------------------------------------  TSH is a thyroid test.  Your level is 0.38 which is slightly low and below goal of 0.45-2.0 and your T3 is high at 235. This test goes opposite of your thyroid dose and suggests your dose of NP thyroid is more than you need. I will decrease your dose to 1 tab on Mon-Sat and 2 tabs on Sun only. I updated your med list but did not send a new prescription to your pharmacy on file that has been filling this med. Addendum: 01/22/22    We had the following mychart exchange:    [facility].  I will send metformin to your local [facility]. Begin Metformin  mg with dinner or right after dinner and take for 1-2 weeks. If no GI side effects (nausea, diarrhea, belly pain), go up to 1000 mg = 2 tabs at dinner. You can split the dose to 1 tab twice daily if you would like. If at any point you cannot tolerate the dose, go back to one tab daily. This will be ready for  at the pharmacy today.   There were a few recalls over the past year from small manufacturers overseas that made certain batches of metformin but these have all been pulled from the shelf and the current ones that are on the shelf, are not affected by these recalls so it's safe to take these.    ===View-only below this line===      ----- Message -----       From:Ayanna Schumacher       Sent:1/21/2022  9:02 AM EST         To:Sj Lu MD    Subject:lab results    A question - I have seen there have been several recalls for metformin, is there any reason to be concerned?      ----- Message -----       From:Ayanna Beau Boykin       Sent:1/21/2022  8:59 AM EST         To:Sj Lu MD    Subject:lab results    Hello, I am ready to start taking Metformin. I havent gained any weight and my diet has basically been the same. I am still using [facility] in [facility] as my pharmacy. I will also make the adjustment of NP Thyroid.      Thank you

## 2022-01-03 RX ORDER — LEVOTHYROXINE, LIOTHYRONINE 38; 9 UG/1; UG/1
TABLET ORAL
Qty: 120 TABLET | Refills: 3 | Status: SHIPPED | OUTPATIENT
Start: 2022-01-03 | End: 2022-01-21 | Stop reason: DRUGHIGH

## 2022-01-19 DIAGNOSIS — E03.8 HYPOTHYROIDISM DUE TO HASHIMOTO'S THYROIDITIS: ICD-10-CM

## 2022-01-19 DIAGNOSIS — R73.02 IMPAIRED GLUCOSE TOLERANCE: ICD-10-CM

## 2022-01-19 DIAGNOSIS — E78.5 HYPERLIPIDEMIA LDL GOAL <100: ICD-10-CM

## 2022-01-19 DIAGNOSIS — E06.3 HYPOTHYROIDISM DUE TO HASHIMOTO'S THYROIDITIS: ICD-10-CM

## 2022-01-21 LAB
ALBUMIN SERPL-MCNC: 4.6 G/DL (ref 3.8–4.8)
ALBUMIN/GLOB SERPL: 1.8 {RATIO} (ref 1.2–2.2)
ALP SERPL-CCNC: 101 IU/L (ref 44–121)
ALT SERPL-CCNC: 31 IU/L (ref 0–32)
AST SERPL-CCNC: 24 IU/L (ref 0–40)
BILIRUB SERPL-MCNC: 0.9 MG/DL (ref 0–1.2)
BUN SERPL-MCNC: 21 MG/DL (ref 8–27)
BUN/CREAT SERPL: 23 (ref 12–28)
CALCIUM SERPL-MCNC: 9.8 MG/DL (ref 8.7–10.3)
CHLORIDE SERPL-SCNC: 100 MMOL/L (ref 96–106)
CHOLEST SERPL-MCNC: 117 MG/DL (ref 100–199)
CO2 SERPL-SCNC: 24 MMOL/L (ref 20–29)
CREAT SERPL-MCNC: 0.91 MG/DL (ref 0.57–1)
EST. AVERAGE GLUCOSE BLD GHB EST-MCNC: 143 MG/DL
GLOBULIN SER CALC-MCNC: 2.5 G/DL (ref 1.5–4.5)
GLUCOSE SERPL-MCNC: 110 MG/DL (ref 65–99)
HBA1C MFR BLD: 6.6 % (ref 4.8–5.6)
HDLC SERPL-MCNC: 41 MG/DL
IMP & REVIEW OF LAB RESULTS: NORMAL
LDLC SERPL CALC-MCNC: 53 MG/DL (ref 0–99)
POTASSIUM SERPL-SCNC: 4.4 MMOL/L (ref 3.5–5.2)
PROT SERPL-MCNC: 7.1 G/DL (ref 6–8.5)
SODIUM SERPL-SCNC: 138 MMOL/L (ref 134–144)
T3 SERPL-MCNC: 235 NG/DL (ref 71–180)
T4 FREE SERPL-MCNC: 1.16 NG/DL (ref 0.82–1.77)
TRIGL SERPL-MCNC: 132 MG/DL (ref 0–149)
TSH SERPL DL<=0.005 MIU/L-ACNC: 0.38 UIU/ML (ref 0.45–4.5)
VLDLC SERPL CALC-MCNC: 23 MG/DL (ref 5–40)

## 2022-01-21 RX ORDER — LEVOTHYROXINE, LIOTHYRONINE 38; 9 UG/1; UG/1
TABLET ORAL
Qty: 120 TABLET | Refills: 3
Start: 2022-01-21 | End: 2022-05-20

## 2022-01-22 RX ORDER — METFORMIN HYDROCHLORIDE 500 MG/1
500 TABLET, EXTENDED RELEASE ORAL
Qty: 180 TABLET | Refills: 3 | Status: SHIPPED | OUTPATIENT
Start: 2022-01-22 | End: 2022-05-20 | Stop reason: SDUPTHER

## 2022-03-18 PROBLEM — E03.9 ACQUIRED HYPOTHYROIDISM: Status: ACTIVE | Noted: 2018-01-01

## 2022-05-04 DIAGNOSIS — E78.5 HYPERLIPIDEMIA LDL GOAL <100: ICD-10-CM

## 2022-05-04 DIAGNOSIS — E06.3 HYPOTHYROIDISM DUE TO HASHIMOTO'S THYROIDITIS: ICD-10-CM

## 2022-05-04 DIAGNOSIS — R73.02 IMPAIRED GLUCOSE TOLERANCE: ICD-10-CM

## 2022-05-04 DIAGNOSIS — E03.8 HYPOTHYROIDISM DUE TO HASHIMOTO'S THYROIDITIS: ICD-10-CM

## 2022-05-06 LAB
ALBUMIN SERPL-MCNC: 4.6 G/DL (ref 3.8–4.8)
ALBUMIN/GLOB SERPL: 2.2 {RATIO} (ref 1.2–2.2)
ALP SERPL-CCNC: 100 IU/L (ref 44–121)
ALT SERPL-CCNC: 34 IU/L (ref 0–32)
AST SERPL-CCNC: 24 IU/L (ref 0–40)
BILIRUB SERPL-MCNC: 1.1 MG/DL (ref 0–1.2)
BUN SERPL-MCNC: 17 MG/DL (ref 8–27)
BUN/CREAT SERPL: 18 (ref 12–28)
CALCIUM SERPL-MCNC: 9.5 MG/DL (ref 8.7–10.3)
CHLORIDE SERPL-SCNC: 99 MMOL/L (ref 96–106)
CHOLEST SERPL-MCNC: 111 MG/DL (ref 100–199)
CO2 SERPL-SCNC: 24 MMOL/L (ref 20–29)
CREAT SERPL-MCNC: 0.93 MG/DL (ref 0.57–1)
EGFR: 69 ML/MIN/1.73
EST. AVERAGE GLUCOSE BLD GHB EST-MCNC: 134 MG/DL
GLOBULIN SER CALC-MCNC: 2.1 G/DL (ref 1.5–4.5)
GLUCOSE SERPL-MCNC: 102 MG/DL (ref 65–99)
HBA1C MFR BLD: 6.3 % (ref 4.8–5.6)
HDLC SERPL-MCNC: 41 MG/DL
IMP & REVIEW OF LAB RESULTS: NORMAL
LDLC SERPL CALC-MCNC: 39 MG/DL (ref 0–99)
POTASSIUM SERPL-SCNC: 4.4 MMOL/L (ref 3.5–5.2)
PROT SERPL-MCNC: 6.7 G/DL (ref 6–8.5)
SODIUM SERPL-SCNC: 140 MMOL/L (ref 134–144)
T3 SERPL-MCNC: 231 NG/DL (ref 71–180)
T4 FREE SERPL-MCNC: 0.99 NG/DL (ref 0.82–1.77)
TRIGL SERPL-MCNC: 193 MG/DL (ref 0–149)
TSH SERPL DL<=0.005 MIU/L-ACNC: 2.59 UIU/ML (ref 0.45–4.5)
VLDLC SERPL CALC-MCNC: 31 MG/DL (ref 5–40)

## 2022-05-20 ENCOUNTER — OFFICE VISIT (OUTPATIENT)
Dept: ENDOCRINOLOGY | Age: 62
End: 2022-05-20
Payer: COMMERCIAL

## 2022-05-20 VITALS
HEART RATE: 91 BPM | WEIGHT: 162.8 LBS | SYSTOLIC BLOOD PRESSURE: 122 MMHG | HEIGHT: 62 IN | BODY MASS INDEX: 29.96 KG/M2 | DIASTOLIC BLOOD PRESSURE: 60 MMHG

## 2022-05-20 DIAGNOSIS — E03.8 HYPOTHYROIDISM DUE TO HASHIMOTO'S THYROIDITIS: Primary | ICD-10-CM

## 2022-05-20 DIAGNOSIS — R73.02 IMPAIRED GLUCOSE TOLERANCE: ICD-10-CM

## 2022-05-20 DIAGNOSIS — E06.3 HYPOTHYROIDISM DUE TO HASHIMOTO'S THYROIDITIS: Primary | ICD-10-CM

## 2022-05-20 DIAGNOSIS — E78.5 HYPERLIPIDEMIA LDL GOAL <100: ICD-10-CM

## 2022-05-20 PROCEDURE — 99214 OFFICE O/P EST MOD 30 MIN: CPT | Performed by: INTERNAL MEDICINE

## 2022-05-20 RX ORDER — METFORMIN HYDROCHLORIDE 500 MG/1
TABLET, EXTENDED RELEASE ORAL
Qty: 360 TABLET | Refills: 3 | Status: SHIPPED | OUTPATIENT
Start: 2022-05-20

## 2022-05-20 RX ORDER — LEVOTHYROXINE, LIOTHYRONINE 38; 9 UG/1; UG/1
TABLET ORAL
Qty: 120 TABLET | Refills: 3
Start: 2022-05-20 | End: 2022-08-22 | Stop reason: DRUGHIGH

## 2022-05-20 NOTE — PROGRESS NOTES
Chief Complaint   Patient presents with    Thyroid Problem     pcp and pharmacy confirmed     History of Present Illness: Ashwin Hill is a 58 y.o. female here for follow up of thyroid. Weight down 5 lbs since last visit in 11/21. Has been taking 8 tabs/week of NP thyroid since 1/22 and hasn't missed any doses and is waiting at least 30 min before food and coffee. Did have [place] in 12/21 and wasn't as active after this and this may have pushed her sugar up by the time her labs were drawn in 1/22 but did not receive any steroid. Has been taking 2 tabs of metformin together in the evening. Initially had a little diarrhea but not currently. Has felt more tired but over the past week has started to feel a little better. Does get up a 4:30am.  Bowels are regular. No hair loss or brittle nails or dry skin. Doesn't feel cold. She would like to increase her metformin to get her A1c lower. Current Outpatient Medications   Medication Sig    metFORMIN ER (GLUCOPHAGE XR) 500 mg tablet Take 1 Tablet by mouth daily (with dinner). And increase as directed to 2 tabs daily    NP Thyroid 60 mg tablet Take 1 tab on Mon-Sat and 2 tabs on Sun--New higher quantity replaces prior script on file--Dose change 01/21/22--updated med list--did not send prescription to the pharmacy    Praluent Pen 150 mg/mL injector pen Once every 2 weeks. No current facility-administered medications for this visit. Allergies   Allergen Reactions    Venom-Honey Bee Anaphylaxis    Ciprofloxacin Hives, Rash and Itching    Penicillins Hives, Rash and Itching    Statins-Hmg-Coa Reductase Inhibitors Myalgia     With lovastatin and crestor and simvastatin    Sulfa (Sulfonamide Antibiotics) Hives, Rash and Itching     Review of Systems: PER HPI    Physical Examination:  Blood pressure 122/60, pulse 91, height 5' 2\" (1.575 m), weight 162 lb 12.8 oz (73.8 kg).   General: pleasant, no distress, good eye contact   Neck: small goiter, (+) right carotid bruit  Cardiovascular: regular, normal rate, nl s1 and s2, no m/r/g   Respiratory: clear to auscultation bilaterally   Integumentary: skin is normal, no edema  Neurological: reflexes 2+ at biceps, no tremors  Psychiatric: normal mood and affect    Data Reviewed:   Component      Latest Ref Rng & Units 5/5/2022   Glucose      65 - 99 mg/dL 102 (H)   BUN      8 - 27 mg/dL 17   Creatinine      0.57 - 1.00 mg/dL 0.93   eGFR      >59 mL/min/1.73 69   BUN/Creatinine ratio      12 - 28 18   Sodium      134 - 144 mmol/L 140   Potassium      3.5 - 5.2 mmol/L 4.4   Chloride      96 - 106 mmol/L 99   CO2      20 - 29 mmol/L 24   Calcium      8.7 - 10.3 mg/dL 9.5   Protein, total      6.0 - 8.5 g/dL 6.7   Albumin      3.8 - 4.8 g/dL 4.6   GLOBULIN, TOTAL      1.5 - 4.5 g/dL 2.1   A-G Ratio      1.2 - 2.2 2.2   Bilirubin, total      0.0 - 1.2 mg/dL 1.1   Alk. phosphatase      44 - 121 IU/L 100   AST      0 - 40 IU/L 24   ALT      0 - 32 IU/L 34 (H)   Cholesterol, total      100 - 199 mg/dL 111   Triglyceride      0 - 149 mg/dL 193 (H)   HDL Cholesterol      >39 mg/dL 41   VLDL, calculated      5 - 40 mg/dL 31   LDL, calculated      0 - 99 mg/dL 39   Hemoglobin A1c, (calculated)      4.8 - 5.6 % 6.3 (H)   Estimated average glucose      mg/dL 134   T4, Free      0.82 - 1.77 ng/dL 0.99   TSH      0.450 - 4.500 uIU/mL 2.590   T3, total      71 - 180 ng/dL 231 (H)       Assessment/Plan:     1. Acquired hypothyroidism to Hashimoto's: Was having fatigue and difficultly losing weight back in 2018 and was checked by her PCP and was found to have hypothyroidism.   Was started on levothyroxine and also took brand synthroid as initial treatments but continued to have fatigue on these and decided to see an endocrinologist and went to Dr. Irena Valenzuela in 2019 and was changed to unithroid initially but still had fatigue so asked to change to natural thyroid hormone and initially was on [facility] thyroid but this was recalled in 10/20 and then changed to NP thyroid at that time. Had been taking 30 mg 1 tab 5x/week and 2 tabs 2x/week until her labs were drawn on 4/12/21 that showed her TSH was 4.59 and her dose was increased to 1 tab 4x/week and 2 tabs 3x/week. TSH 3.22 and TG ab 9.8 and TPO ab 254 in 5/21 at 1st visit with me and increased to 60 mg 6x/week and TSH 2.82 in 7/21 and increased to 60 mg daily and TSH 1.44 in 9/21 so kept dose the same. TSH up to 1.67 in 11/21 so increased to 9 tabs/week to see if targeting a TSH of 0.5-1.0 helps her weight and energy. TSH down to 0.384 in 1/22 so decreased to 8 tabs/week and TSH 2.59 in 5/22 so increased back to 9 tabs/week as her TSH may have been low in 1/22 due to having had COVID in 12/21.  - increase NP thyroid to 60 mg on Mon-Fri and 2 tabs on Sat-Sun  - check TSH, free T4, and total T3 in 3 months and prior to next visit          2. Impaired glucose tolerance: her most recent Hgb A1c was 6.3% in 5/22 down from 6.6% in 1/22 up from 6.3% in 9/21. Will max out metformin. - increase metformin to 3 tabs daily x 2 weeks then 4 tabs daily  - check A1c and cmp in 3 months and prior to next visit        3. Hyperlipidemia LDL goal <100: LDL 22 and  in 9/21 and LDL 53 and  in 1/22 and LDL 39 and  in 5/22 on praluent 150 mg every 2 weeks. - cont praluent 150 mg every 2 weeks  - check lipids in 3 months and prior to next visit          Patient Instructions   1) TSH is a thyroid test.  Your level is 2.5 which is normal but above my goal of 0.45-1.0. This test goes opposite of your thyroid dose and suggests your dose of NP thyroid is not enough and it's possible your TSH was slightly low in 1/22 due to having had COVID. I will increase your dose back to 1 tab 5x/week and 2 tabs on Sat-Sun.       2) Your total and LDL (bad cholesterol) were both very good but your triglycerides (short term fats) were likely higher due to what you ate the night before the lab draw.     3) Hemoglobin A1c is a 3 month marker of your blood sugar control. Normal is less than 5.7% and diabetes is greater than 6.4%. Your Hemoglobin A1c is 6.3% which means your blood sugar is back in the borderline diabetic range and under better control than last check when your value was 6.6%. Continue to work on your diet and exercise and take all your medications as directed. Increase your metformin to 3 tabs together for 1-2 weeks or 1 tab in the morning and 2 tabs at night if better tolerated and then go to 4 tabs daily. We'll reassess your A1c in 3 months and see how this is doing. 4) BUN and creatinine are markers of kidney function. Your values are normal.    5) ALT and AST are markers of liver function. Your ALT was just barely high and should come back next time and I'm not concerned. 6) Your blood pressure is excellent. Follow-up and Dispositions    Return in about 6 months (around 11/20/2022). Copy sent to:  Lauren Noonan NP (Nurse Practitioner)  Holly Terry MD (Cardiology)    Lab follow up: 08/21/22  Component      Latest Ref Rng & Units 8/19/2022   Glucose      65 - 99 mg/dL 108 (H)   BUN      8 - 27 mg/dL 19   Creatinine      0.57 - 1.00 mg/dL 0.97   eGFR      >59 mL/min/1.73 66   BUN/Creatinine ratio      12 - 28 20   Sodium      134 - 144 mmol/L 139   Potassium      3.5 - 5.2 mmol/L 4.5   Chloride      96 - 106 mmol/L 100   CO2      20 - 29 mmol/L 24   Calcium      8.7 - 10.3 mg/dL 9.7   Protein, total      6.0 - 8.5 g/dL 7.2   Albumin      3.8 - 4.8 g/dL 4.9 (H)   GLOBULIN, TOTAL      1.5 - 4.5 g/dL 2.3   A-G Ratio      1.2 - 2.2 2.1   Bilirubin, total      0.0 - 1.2 mg/dL 0.9   Alk.  phosphatase      44 - 121 IU/L 105   AST      0 - 40 IU/L 25   ALT      0 - 32 IU/L 39 (H)   Cholesterol, total      100 - 199 mg/dL 120   Triglyceride      0 - 149 mg/dL 172 (H)   HDL Cholesterol      >39 mg/dL 43   VLDL, calculated      5 - 40 mg/dL 29   LDL, calculated      0 - 99 mg/dL 48 Hemoglobin A1c, (calculated)      4.8 - 5.6 % 6.0 (H)   Estimated average glucose      mg/dL 126   T4, Free      0.82 - 1.77 ng/dL 1.01   TSH      0.450 - 4.500 uIU/mL 3.770   T3, total      71 - 180 ng/dL 192 (H)     Sent her the following message through [employer]:    Hemoglobin A1c is a 3 month marker of your blood sugar control. Normal is less than 5.7% and diabetes is greater than 6.4%. Your Hemoglobin A1c is 6% which means your blood sugar is still in the borderline diabetic range and under better control than last check when your value was 6.3%. Continue to work on your diet and exercise and take all your medications as directed.  -------------------------------------------------------------------------------------------------------------------  Total Cholesterol is the total number of cholesterol particles in your blood. Goal is less than 200. Triglycerides are the short term fats in your blood. Goal is less than 150. HDL is the good cholesterol in your blood. Goal is more than 50 if you are a woman and 40 if you are a man. LDL is the bad cholesterol in your blood. Goal is less than 100 unless you have a history of heart disease or stroke and then goal is under 70. Your cholesterol is at goal aside from slightly romulo triglycerides but these have improved since last time. Continue to follow a low cholesterol diet. Try to limit the amount of fried foods, fatty foods, butter, gravy, red meat, ice cream, cheese, and eggs in your diet, which are all high in cholesterol.  -------------------------------------------------------------------------------------------------------------------  BUN and creatinine are markers of kidney function. Your values are normal.  -------------------------------------------------------------------------------------------------------------------  ALT and AST are markers of liver function.   Your ALT is slightly higher than last time and likely elevated due to fatty deposition in the liver that can occur with weight gain. Hopefully with weight loss, this value will return to normal.  -------------------------------------------------------------------------------------------------------------------  TSH is a thyroid test.  Your level is 3.77 which is normal but above goal of 0.45-2.0. This test goes opposite of your thyroid dose and suggests your dose of NP thyroid is possibly not enough or you have missed doses in the past 6 weeks or are not taking it properly. Please confirm if you have missed any doses or not in the past 6 weeks and are still taking 1 tab on Mon-Fri and 2 tabs on Sat-Sun  Are you taking on an empty stomach with just water at least 30 min before food and coffee? Are you taking any vitamins within 4 hours of your pill? Are you on any new acid reflux medications or any other new medications? Let me know the answers to these questions and I'll determine if we need to make a dose change or not. Addendum: 08/22/22  We had the following [employer] exchange:    Ok. If this is the case, let's increase your NP thyroid to 90 mg to take 1 tab 7 days a week until you come back in November. I sent this to [facility] now. Feel free to use up the 60 mg tabs taking 11 tabs/week = 1 tab on Mon, Wed, and Fri and 2 tabs on Tues, Thurs, Sat and Sun. If you feel you need your levels checked sooner than November, just be in touch.  ===View-only below this line===      ----- Message -----       From:Ayanna Kenny       Sent:8/22/2022 12:31 PM EDT         To:Sj Boogie MD    Subject:lab results    Hello - I have not missed any doses of NP Thyroid. Always take on an empty stomach and nothing to eat or drink for a minimum of 1 hour. No new medications.

## 2022-05-20 NOTE — PATIENT INSTRUCTIONS
1) TSH is a thyroid test.  Your level is 2.5 which is normal but above my goal of 0.45-1.0. This test goes opposite of your thyroid dose and suggests your dose of NP thyroid is not enough and it's possible your TSH was slightly low in 1/22 due to having had COVID. I will increase your dose back to 1 tab 5x/week and 2 tabs on Sat-Sun.       2) Your total and LDL (bad cholesterol) were both very good but your triglycerides (short term fats) were likely higher due to what you ate the night before the lab draw. 3) Hemoglobin A1c is a 3 month marker of your blood sugar control. Normal is less than 5.7% and diabetes is greater than 6.4%. Your Hemoglobin A1c is 6.3% which means your blood sugar is back in the borderline diabetic range and under better control than last check when your value was 6.6%. Continue to work on your diet and exercise and take all your medications as directed. Increase your metformin to 3 tabs together for 1-2 weeks or 1 tab in the morning and 2 tabs at night if better tolerated and then go to 4 tabs daily. We'll reassess your A1c in 3 months and see how this is doing. 4) BUN and creatinine are markers of kidney function. Your values are normal.    5) ALT and AST are markers of liver function. Your ALT was just barely high and should come back next time and I'm not concerned. 6) Your blood pressure is excellent.

## 2022-08-20 DIAGNOSIS — R73.02 IMPAIRED GLUCOSE TOLERANCE: ICD-10-CM

## 2022-08-20 DIAGNOSIS — E78.5 HYPERLIPIDEMIA LDL GOAL <100: ICD-10-CM

## 2022-08-20 DIAGNOSIS — E03.8 HYPOTHYROIDISM DUE TO HASHIMOTO'S THYROIDITIS: ICD-10-CM

## 2022-08-20 DIAGNOSIS — E06.3 HYPOTHYROIDISM DUE TO HASHIMOTO'S THYROIDITIS: ICD-10-CM

## 2022-08-20 LAB
ALBUMIN SERPL-MCNC: 4.9 G/DL (ref 3.8–4.8)
ALBUMIN/GLOB SERPL: 2.1 {RATIO} (ref 1.2–2.2)
ALP SERPL-CCNC: 105 IU/L (ref 44–121)
ALT SERPL-CCNC: 39 IU/L (ref 0–32)
AST SERPL-CCNC: 25 IU/L (ref 0–40)
BILIRUB SERPL-MCNC: 0.9 MG/DL (ref 0–1.2)
BUN SERPL-MCNC: 19 MG/DL (ref 8–27)
BUN/CREAT SERPL: 20 (ref 12–28)
CALCIUM SERPL-MCNC: 9.7 MG/DL (ref 8.7–10.3)
CHLORIDE SERPL-SCNC: 100 MMOL/L (ref 96–106)
CHOLEST SERPL-MCNC: 120 MG/DL (ref 100–199)
CO2 SERPL-SCNC: 24 MMOL/L (ref 20–29)
CREAT SERPL-MCNC: 0.97 MG/DL (ref 0.57–1)
EGFR: 66 ML/MIN/1.73
EST. AVERAGE GLUCOSE BLD GHB EST-MCNC: 126 MG/DL
GLOBULIN SER CALC-MCNC: 2.3 G/DL (ref 1.5–4.5)
GLUCOSE SERPL-MCNC: 108 MG/DL (ref 65–99)
HBA1C MFR BLD: 6 % (ref 4.8–5.6)
HDLC SERPL-MCNC: 43 MG/DL
IMP & REVIEW OF LAB RESULTS: NORMAL
LDLC SERPL CALC-MCNC: 48 MG/DL (ref 0–99)
POTASSIUM SERPL-SCNC: 4.5 MMOL/L (ref 3.5–5.2)
PROT SERPL-MCNC: 7.2 G/DL (ref 6–8.5)
SODIUM SERPL-SCNC: 139 MMOL/L (ref 134–144)
T3 SERPL-MCNC: 192 NG/DL (ref 71–180)
T4 FREE SERPL-MCNC: 1.01 NG/DL (ref 0.82–1.77)
TRIGL SERPL-MCNC: 172 MG/DL (ref 0–149)
TSH SERPL DL<=0.005 MIU/L-ACNC: 3.77 UIU/ML (ref 0.45–4.5)
VLDLC SERPL CALC-MCNC: 29 MG/DL (ref 5–40)

## 2022-08-22 RX ORDER — LEVOTHYROXINE, LIOTHYRONINE 57; 13.5 UG/1; UG/1
90 TABLET ORAL DAILY
Qty: 90 TABLET | Refills: 3 | Status: SHIPPED | OUTPATIENT
Start: 2022-08-22

## 2022-11-06 DIAGNOSIS — R73.02 IMPAIRED GLUCOSE TOLERANCE: ICD-10-CM

## 2022-11-06 DIAGNOSIS — E06.3 HYPOTHYROIDISM DUE TO HASHIMOTO'S THYROIDITIS: ICD-10-CM

## 2022-11-06 DIAGNOSIS — E03.8 HYPOTHYROIDISM DUE TO HASHIMOTO'S THYROIDITIS: ICD-10-CM

## 2022-11-06 DIAGNOSIS — E78.5 HYPERLIPIDEMIA LDL GOAL <100: ICD-10-CM

## 2022-11-09 LAB
ALBUMIN SERPL-MCNC: 4.6 G/DL (ref 3.8–4.8)
ALBUMIN/GLOB SERPL: 2 {RATIO} (ref 1.2–2.2)
ALP SERPL-CCNC: 107 IU/L (ref 44–121)
ALT SERPL-CCNC: 26 IU/L (ref 0–32)
AST SERPL-CCNC: 18 IU/L (ref 0–40)
BILIRUB SERPL-MCNC: 0.9 MG/DL (ref 0–1.2)
BUN SERPL-MCNC: 16 MG/DL (ref 8–27)
BUN/CREAT SERPL: 21 (ref 12–28)
CALCIUM SERPL-MCNC: 9.5 MG/DL (ref 8.7–10.3)
CHLORIDE SERPL-SCNC: 103 MMOL/L (ref 96–106)
CHOLEST SERPL-MCNC: 108 MG/DL (ref 100–199)
CO2 SERPL-SCNC: 23 MMOL/L (ref 20–29)
CREAT SERPL-MCNC: 0.75 MG/DL (ref 0.57–1)
EGFR: 90 ML/MIN/1.73
EST. AVERAGE GLUCOSE BLD GHB EST-MCNC: 126 MG/DL
GLOBULIN SER CALC-MCNC: 2.3 G/DL (ref 1.5–4.5)
GLUCOSE SERPL-MCNC: 95 MG/DL (ref 70–99)
HBA1C MFR BLD: 6 % (ref 4.8–5.6)
HDLC SERPL-MCNC: 42 MG/DL
IMP & REVIEW OF LAB RESULTS: NORMAL
LDLC SERPL CALC-MCNC: 36 MG/DL (ref 0–99)
POTASSIUM SERPL-SCNC: 4.4 MMOL/L (ref 3.5–5.2)
PROT SERPL-MCNC: 6.9 G/DL (ref 6–8.5)
SODIUM SERPL-SCNC: 142 MMOL/L (ref 134–144)
T3 SERPL-MCNC: 210 NG/DL (ref 71–180)
T4 FREE SERPL-MCNC: 1.16 NG/DL (ref 0.82–1.77)
TRIGL SERPL-MCNC: 186 MG/DL (ref 0–149)
TSH SERPL DL<=0.005 MIU/L-ACNC: 0.37 UIU/ML (ref 0.45–4.5)
VLDLC SERPL CALC-MCNC: 30 MG/DL (ref 5–40)

## 2022-11-28 ENCOUNTER — OFFICE VISIT (OUTPATIENT)
Dept: ENDOCRINOLOGY | Age: 62
End: 2022-11-28
Payer: COMMERCIAL

## 2022-11-28 VITALS
SYSTOLIC BLOOD PRESSURE: 115 MMHG | DIASTOLIC BLOOD PRESSURE: 61 MMHG | BODY MASS INDEX: 29.55 KG/M2 | HEIGHT: 62 IN | HEART RATE: 107 BPM | WEIGHT: 160.6 LBS

## 2022-11-28 DIAGNOSIS — E03.8 HYPOTHYROIDISM DUE TO HASHIMOTO'S THYROIDITIS: Primary | ICD-10-CM

## 2022-11-28 DIAGNOSIS — R73.02 IMPAIRED GLUCOSE TOLERANCE: ICD-10-CM

## 2022-11-28 DIAGNOSIS — E78.5 HYPERLIPIDEMIA LDL GOAL <100: ICD-10-CM

## 2022-11-28 DIAGNOSIS — E06.3 HYPOTHYROIDISM DUE TO HASHIMOTO'S THYROIDITIS: Primary | ICD-10-CM

## 2022-11-28 PROCEDURE — 99214 OFFICE O/P EST MOD 30 MIN: CPT | Performed by: INTERNAL MEDICINE

## 2022-11-28 RX ORDER — LEVOTHYROXINE, LIOTHYRONINE 57; 13.5 UG/1; UG/1
TABLET ORAL
Qty: 90 TABLET | Refills: 3
Start: 2022-11-28

## 2022-11-28 NOTE — PROGRESS NOTES
Chief Complaint   Patient presents with    Thyroid Problem     PCP and pharmacy confirmed      History of Present Illness: Christal Mallory is a 58 y.o. female here for follow up of thyroid. Weight down 2 lbs since last visit in 5/22. Has been taking all 4 tabs of metformin together in the evening and may have some rare diarrhea but nothing regular. Has been getting the higher dose of NP thyroid 90 mg 1 tab daily since August at least 30 min before food and coffee with just water and hasn't missed any doses. Overall energy has been better on the higher dose. No chest pain or palpitations or tremors. No hair loss or brittle nails. Has been using a collagen supplement. Tends to stay hot but no worse on the higher dose. Still getting praluent every 2 weeks. She would still like labs in 3 months for accountability. Current Outpatient Medications   Medication Sig    NP Thyroid 90 mg tablet Take 1 Tablet by mouth daily. BRAND MEDICALLY NECESSARY--Delete 60 mg dose from profile    metFORMIN ER (GLUCOPHAGE XR) 500 mg tablet Take 3 tabs daily for 1-2 weeks and then increase to 4 tabs daily    Praluent Pen 150 mg/mL injector pen Once every 2 weeks. No current facility-administered medications for this visit. Allergies   Allergen Reactions    Venom-Honey Bee Anaphylaxis    Ciprofloxacin Hives, Rash and Itching    Penicillins Hives, Rash and Itching    Statins-Hmg-Coa Reductase Inhibitors Myalgia     With lovastatin and crestor and simvastatin    Sulfa (Sulfonamide Antibiotics) Hives, Rash and Itching     Review of Systems: PER HPI    Physical Examination:  Blood pressure 115/61, pulse (!) 107, height 5' 2\" (1.575 m), weight 160 lb 9.6 oz (72.8 kg).   General: pleasant, no distress, good eye contact   Neck: small goiter, no carotid bruits  Cardiovascular: regular, (+) tachy, nl s1 and s2, no m/r/g   Respiratory: clear to auscultation bilaterally   Integumentary: skin is normal, no edema  Neurological: reflexes 2+ at biceps, very mild tremor  Psychiatric: normal mood and affect    Data Reviewed:   Component      Latest Ref Rng & Units 11/8/2022   Glucose      70 - 99 mg/dL 95   BUN      8 - 27 mg/dL 16   Creatinine      0.57 - 1.00 mg/dL 0.75   eGFR      >59 mL/min/1.73 90   BUN/Creatinine ratio      12 - 28 21   Sodium      134 - 144 mmol/L 142   Potassium      3.5 - 5.2 mmol/L 4.4   Chloride      96 - 106 mmol/L 103   CO2      20 - 29 mmol/L 23   Calcium      8.7 - 10.3 mg/dL 9.5   Protein, total      6.0 - 8.5 g/dL 6.9   Albumin      3.8 - 4.8 g/dL 4.6   GLOBULIN, TOTAL      1.5 - 4.5 g/dL 2.3   A-G Ratio      1.2 - 2.2 2.0   Bilirubin, total      0.0 - 1.2 mg/dL 0.9   Alk. phosphatase      44 - 121 IU/L 107   AST      0 - 40 IU/L 18   ALT      0 - 32 IU/L 26   Cholesterol, total      100 - 199 mg/dL 108   Triglyceride      0 - 149 mg/dL 186 (H)   HDL Cholesterol      >39 mg/dL 42   VLDL, calculated      5 - 40 mg/dL 30   LDL, calculated      0 - 99 mg/dL 36   Hemoglobin A1c, (calculated)      4.8 - 5.6 % 6.0 (H)   Estimated average glucose      mg/dL 126   T4, Free      0.82 - 1.77 ng/dL 1.16   TSH      0.450 - 4.500 uIU/mL 0.368 (L)   T3, total      71 - 180 ng/dL 210 (H)       Assessment/Plan:     1. Acquired hypothyroidism to Hashimoto's: Was having fatigue and difficultly losing weight back in 2018 and was checked by her PCP and was found to have hypothyroidism. Was started on levothyroxine and also took brand synthroid as initial treatments but continued to have fatigue on these and decided to see an endocrinologist and went to Dr. Tera Roe in 2019 and was changed to unithroid initially but still had fatigue so asked to change to natural thyroid hormone and initially was on [facility] thyroid but this was recalled in 10/20 and then changed to NP thyroid at that time.   Had been taking 30 mg 1 tab 5x/week and 2 tabs 2x/week until her labs were drawn on 4/12/21 that showed her TSH was 4.59 and her dose was increased to 1 tab 4x/week and 2 tabs 3x/week. TSH 3.22 and TG ab 9.8 and TPO ab 254 in 5/21 at 1st visit with me and increased to 60 mg 6x/week and TSH 2.82 in 7/21 and increased to 60 mg daily and TSH 1.44 in 9/21 so kept dose the same. TSH up to 1.67 in 11/21 so increased to 9 tabs/week to see if targeting a TSH of 0.5-1.0 helps her weight and energy. TSH down to 0.384 in 1/22 so decreased to 8 tabs/week and TSH 2.59 in 5/22 so increased back to 9 tabs/week as her TSH may have been low in 1/22 due to having had COVID in 12/21. TSH 3.77 in 8/22 so increased to 90 mg daily and TSH 0.36 in 11/22 so decreased to 6.5 tabs/week. - decrease NP thyroid to 90 mg 1 tab on Mon-Sat and 1/2 tab on Sun  - check TSH, free T4, and total T3 in 3 months and prior to next visit          2. Impaired glucose tolerance: her most recent Hgb A1c was 6% in 11/22 stable from 8/22 down from 6.3% in 5/22 down from 6.6% in 1/22 up from 6.3% in 9/21.    - cont metformin  mg 4 tabs daily  - check A1c and cmp in 3 months and prior to next visit        3. Hyperlipidemia LDL goal <100: LDL 22 and  in 9/21 and LDL 53 and  in 1/22 and LDL 39 and  in 5/22 and LDL 48 and  in 8/22 and LDL 36 and  in 11/22 on praluent 150 mg every 2 weeks. - cont praluent 150 mg every 2 weeks  - check lipids in 3 months and prior to next visit        Patient Instructions   1) Hemoglobin A1c is a 3 month marker of your blood sugar control. Normal is less than 5.7% and diabetes is greater than 6.4%. Your Hemoglobin A1c is 6% which means your blood sugar is still in the borderline diabetic range and under stable control than last check when your value was 6%. Continue to work on your diet and exercise and take all your medications as directed. 2) Your liver and kidney are normal and your cholesterol is at goal aside from the triglycerides (short term fats) but they are better than last time.     3) TSH is a thyroid test. Your level is 0.36 which is slightly low and below goal of 0.45-2.0. This test goes opposite of your thyroid dose and suggests your dose of NP thyroid is slightly more than you need. I will decrease your dose to 1 tab on Mon-Sat and 1/2 tab on Sun. I updated your med list but did not send a new prescription to your pharmacy on file that has been filling this med. 4) Plan on having labs again in 3 months and I'll write to you through [employer]. Follow-up and Dispositions    Return in about 6 months (around 5/28/2023).                Copy sent to:  Mi Juárez NP (Nurse Practitioner)  Julieth Beasley MD (Cardiology)

## 2022-11-28 NOTE — PATIENT INSTRUCTIONS
1) Hemoglobin A1c is a 3 month marker of your blood sugar control. Normal is less than 5.7% and diabetes is greater than 6.4%. Your Hemoglobin A1c is 6% which means your blood sugar is still in the borderline diabetic range and under stable control than last check when your value was 6%. Continue to work on your diet and exercise and take all your medications as directed. 2) Your liver and kidney are normal and your cholesterol is at goal aside from the triglycerides (short term fats) but they are better than last time. 3) TSH is a thyroid test.  Your level is 0.36 which is slightly low and below goal of 0.45-2.0. This test goes opposite of your thyroid dose and suggests your dose of NP thyroid is slightly more than you need. I will decrease your dose to 1 tab on Mon-Sat and 1/2 tab on Sun. I updated your med list but did not send a new prescription to your pharmacy on file that has been filling this med. 4) Plan on having labs again in 3 months and I'll write to you through [employer].

## 2023-01-19 ENCOUNTER — OFFICE VISIT (OUTPATIENT)
Dept: URGENT CARE | Age: 63
End: 2023-01-19
Payer: COMMERCIAL

## 2023-01-19 VITALS
SYSTOLIC BLOOD PRESSURE: 143 MMHG | HEART RATE: 68 BPM | OXYGEN SATURATION: 97 % | RESPIRATION RATE: 18 BRPM | DIASTOLIC BLOOD PRESSURE: 84 MMHG | WEIGHT: 160 LBS | TEMPERATURE: 98.1 F | BODY MASS INDEX: 29.26 KG/M2

## 2023-01-19 DIAGNOSIS — R68.89 FLU-LIKE SYMPTOMS: Primary | ICD-10-CM

## 2023-01-19 LAB
FLUAV+FLUBV AG NOSE QL IA.RAPID: NEGATIVE
FLUAV+FLUBV AG NOSE QL IA.RAPID: NEGATIVE
SARS-COV-2 PCR, POC: NEGATIVE
VALID INTERNAL CONTROL?: YES

## 2023-01-19 PROCEDURE — 87804 INFLUENZA ASSAY W/OPTIC: CPT | Performed by: NURSE PRACTITIONER

## 2023-01-19 PROCEDURE — 99203 OFFICE O/P NEW LOW 30 MIN: CPT | Performed by: NURSE PRACTITIONER

## 2023-01-19 PROCEDURE — 87635 SARS-COV-2 COVID-19 AMP PRB: CPT | Performed by: NURSE PRACTITIONER

## 2023-01-19 NOTE — PROGRESS NOTES
Subjective: (As above and below)     The patient/guardian gave verbal consent to treat. Chief Complaint   Patient presents with    Cold Symptoms     Pt. C/o cough and sore throat that started yesterday. Dinorah Wilder is a 58 y.o. female who presents for evaluation of : sore throat, cough, body aches, chills. Symptom onset 1 day ago . Preceding illness: none. No other identified aggravating or alleviating factors. Symptoms are constant and overall unchanged. Denies: SOB, vomiting, rashes    Known Exposure to COVID-19: no      ROS  Review of Systems - negative except as listed above    Reviewed PmHx, RxHx, FmHx, SocHx, AllgHx and updated in chart. Family History   Problem Relation Age of Onset    Thyroid Disease Mother     Pneumonia Father         and had c-diff    Thyroid Disease Sister        Past Medical History:   Diagnosis Date    Acquired hypothyroidism     due to hashimoto's: TPO ab 254 and TG ab 9.8 in     COVID-19 2021    High cholesterol     Sleep apnea     on CPAP      Social History     Socioeconomic History    Marital status:    Tobacco Use    Smoking status: Former     Packs/day: 0.50     Years: 20.00     Pack years: 10.00     Types: Cigarettes     Quit date: 2014     Years since quittin.4    Smokeless tobacco: Never   Substance and Sexual Activity    Alcohol use: Not Currently    Drug use: Never   Social History Narrative    Lives in [place] with  from 2nd marriage. Has 3 sons from her 1st marriage. Works an  for [employer]. Likes to garden and spend time with her grandkids (has 6 of them by her family and 2 by her current 's).           Current Outpatient Medications   Medication Sig    NP Thyroid 90 mg tablet Take 1 tab on Mon-Sat and 1/2 tab on Sun--BRAND MEDICALLY NECESSARY--Delete 60 mg dose from profile--Dose change 22--updated med list--did not send prescription to the pharmacy    metFORMIN ER (GLUCOPHAGE XR) 500 mg tablet Take 3 tabs daily for 1-2 weeks and then increase to 4 tabs daily    Praluent Pen 150 mg/mL injector pen Once every 2 weeks. No current facility-administered medications for this visit. Objective:     Vitals:    01/19/23 1057   BP: (!) 143/84   Pulse: 68   Resp: 18   Temp: 98.1 °F (36.7 °C)   SpO2: 97%   Weight: 160 lb (72.6 kg)       Physical Exam  General appearance - appears well hydrated and does not appear toxic, no acute distress  Eyes - EOMs intact. Non injected. No scleral icterus   Ears - no external swelling. TMs normal bilat. Nose - nasal congestion. No purulent drainage  Mouth - OP clear without swelling, exudate or lesion. Mucus membranes moist. Uvula midline. Neck/Lymphatics - trachea midline, full AROM, no LAD of neck  Chest - Normal breathing effort no wheeze rales, rhonchi or diminishments bilaterally. Heart - RRR, no murmurs  Skin - no observable rashes or pallor  Neurologic- alert and oriented x 3  Psychiatric- normal mood, behavior and though content. Assessment/ Plan:     1. Flu-like symptoms    - AMB POC CLAUDINE INFLUENZA A/B TEST  - POCT COVID-19, SARS-COV-2, PCR      Covid 19 test result today negative  Rapid flu test negative  No evidence suggesting complication of illness at this time. Will discharge home with close monitoring and follow up.   Supportive home care advised- maintain adequate fluid intake, over the counter Tylenol (for fever, aches, pains, chills), deep breathing exercises, nasal saline sprays for congestion, humidified air bedroom at night      Test Results:  Recent Results (from the past 6 hour(s))   POCT COVID-19, SARS-COV-2, PCR    Collection Time: 01/19/23 11:25 AM   Result Value Ref Range    SARS-COV-2 PCR, POC Negative Negative   AMB POC CLAUDINE INFLUENZA A/B TEST    Collection Time: 01/19/23 11:26 AM   Result Value Ref Range    VALID INTERNAL CONTROL POC Yes     Influenza A Ag POC Negative Negative    Influenza B Ag POC Negative Negative       Follow up: Follow up immediately for any new, worsening or changes or if symptoms are not improving over the next 5-7 days.          Walter Phillips NP

## 2023-02-22 LAB
ALBUMIN SERPL-MCNC: 4.5 G/DL (ref 3.8–4.8)
ALBUMIN/GLOB SERPL: 2.1 {RATIO} (ref 1.2–2.2)
ALP SERPL-CCNC: 86 IU/L (ref 44–121)
ALT SERPL-CCNC: 29 IU/L (ref 0–32)
AST SERPL-CCNC: 22 IU/L (ref 0–40)
BILIRUB SERPL-MCNC: 0.8 MG/DL (ref 0–1.2)
BUN SERPL-MCNC: 12 MG/DL (ref 8–27)
BUN/CREAT SERPL: 14 (ref 12–28)
CALCIUM SERPL-MCNC: 9.8 MG/DL (ref 8.7–10.3)
CHLORIDE SERPL-SCNC: 100 MMOL/L (ref 96–106)
CHOLEST SERPL-MCNC: 111 MG/DL (ref 100–199)
CO2 SERPL-SCNC: 25 MMOL/L (ref 20–29)
CREAT SERPL-MCNC: 0.83 MG/DL (ref 0.57–1)
EGFRCR SERPLBLD CKD-EPI 2021: 80 ML/MIN/1.73
EST. AVERAGE GLUCOSE BLD GHB EST-MCNC: 131 MG/DL
GLOBULIN SER CALC-MCNC: 2.1 G/DL (ref 1.5–4.5)
GLUCOSE SERPL-MCNC: 94 MG/DL (ref 70–99)
HBA1C MFR BLD: 6.2 % (ref 4.8–5.6)
HDLC SERPL-MCNC: 41 MG/DL
IMP & REVIEW OF LAB RESULTS: NORMAL
LDLC SERPL CALC-MCNC: 34 MG/DL (ref 0–99)
POTASSIUM SERPL-SCNC: 4.3 MMOL/L (ref 3.5–5.2)
PROT SERPL-MCNC: 6.6 G/DL (ref 6–8.5)
SODIUM SERPL-SCNC: 139 MMOL/L (ref 134–144)
T3 SERPL-MCNC: 210 NG/DL (ref 71–180)
T4 FREE SERPL-MCNC: 1.07 NG/DL (ref 0.82–1.77)
TRIGL SERPL-MCNC: 232 MG/DL (ref 0–149)
TSH SERPL DL<=0.005 MIU/L-ACNC: 0.58 UIU/ML (ref 0.45–4.5)
VLDLC SERPL CALC-MCNC: 36 MG/DL (ref 5–40)

## 2023-02-28 DIAGNOSIS — E03.8 HYPOTHYROIDISM DUE TO HASHIMOTO'S THYROIDITIS: ICD-10-CM

## 2023-02-28 DIAGNOSIS — R73.02 IMPAIRED GLUCOSE TOLERANCE: ICD-10-CM

## 2023-02-28 DIAGNOSIS — E06.3 HYPOTHYROIDISM DUE TO HASHIMOTO'S THYROIDITIS: ICD-10-CM

## 2023-02-28 DIAGNOSIS — E78.5 HYPERLIPIDEMIA LDL GOAL <100: ICD-10-CM

## 2023-04-22 DIAGNOSIS — E06.3 HYPOTHYROIDISM DUE TO HASHIMOTO'S THYROIDITIS: Primary | ICD-10-CM

## 2023-04-22 DIAGNOSIS — R73.02 IMPAIRED GLUCOSE TOLERANCE: ICD-10-CM

## 2023-04-22 DIAGNOSIS — E03.8 HYPOTHYROIDISM DUE TO HASHIMOTO'S THYROIDITIS: Primary | ICD-10-CM

## 2023-04-22 DIAGNOSIS — E78.5 HYPERLIPIDEMIA LDL GOAL <100: ICD-10-CM

## 2023-04-23 DIAGNOSIS — E78.5 HYPERLIPIDEMIA LDL GOAL <100: ICD-10-CM

## 2023-04-23 DIAGNOSIS — E03.8 HYPOTHYROIDISM DUE TO HASHIMOTO'S THYROIDITIS: Primary | ICD-10-CM

## 2023-04-23 DIAGNOSIS — E06.3 HYPOTHYROIDISM DUE TO HASHIMOTO'S THYROIDITIS: Primary | ICD-10-CM

## 2023-04-23 DIAGNOSIS — R73.02 IMPAIRED GLUCOSE TOLERANCE: ICD-10-CM

## 2023-04-24 DIAGNOSIS — E78.5 HYPERLIPIDEMIA LDL GOAL <100: ICD-10-CM

## 2023-04-24 DIAGNOSIS — R73.02 IMPAIRED GLUCOSE TOLERANCE: ICD-10-CM

## 2023-04-24 DIAGNOSIS — E03.8 HYPOTHYROIDISM DUE TO HASHIMOTO'S THYROIDITIS: Primary | ICD-10-CM

## 2023-04-24 DIAGNOSIS — E06.3 HYPOTHYROIDISM DUE TO HASHIMOTO'S THYROIDITIS: Primary | ICD-10-CM

## 2023-04-28 DIAGNOSIS — E78.5 HYPERLIPIDEMIA LDL GOAL <100: ICD-10-CM

## 2023-04-28 DIAGNOSIS — E06.3 HYPOTHYROIDISM DUE TO HASHIMOTO'S THYROIDITIS: Primary | ICD-10-CM

## 2023-04-28 DIAGNOSIS — R73.02 IMPAIRED GLUCOSE TOLERANCE: ICD-10-CM

## 2023-04-28 DIAGNOSIS — E03.8 HYPOTHYROIDISM DUE TO HASHIMOTO'S THYROIDITIS: Primary | ICD-10-CM

## 2023-05-11 LAB
ALBUMIN SERPL-MCNC: 4.9 G/DL (ref 3.8–4.8)
ALBUMIN/GLOB SERPL: 2 {RATIO} (ref 1.2–2.2)
ALP SERPL-CCNC: 98 IU/L (ref 44–121)
ALT SERPL-CCNC: 30 IU/L (ref 0–32)
AST SERPL-CCNC: 24 IU/L (ref 0–40)
BILIRUB SERPL-MCNC: 1.3 MG/DL (ref 0–1.2)
BUN SERPL-MCNC: 16 MG/DL (ref 8–27)
BUN/CREAT SERPL: 19 (ref 12–28)
CALCIUM SERPL-MCNC: 9.9 MG/DL (ref 8.7–10.3)
CHLORIDE SERPL-SCNC: 102 MMOL/L (ref 96–106)
CHOLEST SERPL-MCNC: 122 MG/DL (ref 100–199)
CO2 SERPL-SCNC: 23 MMOL/L (ref 20–29)
CREAT SERPL-MCNC: 0.83 MG/DL (ref 0.57–1)
EGFRCR SERPLBLD CKD-EPI 2021: 79 ML/MIN/1.73
EST. AVERAGE GLUCOSE BLD GHB EST-MCNC: 128 MG/DL
GLOBULIN SER CALC-MCNC: 2.5 G/DL (ref 1.5–4.5)
GLUCOSE SERPL-MCNC: 105 MG/DL (ref 70–99)
HBA1C MFR BLD: 6.1 % (ref 4.8–5.6)
HDLC SERPL-MCNC: 46 MG/DL
IMP & REVIEW OF LAB RESULTS: NORMAL
LDLC SERPL CALC-MCNC: 44 MG/DL (ref 0–99)
POTASSIUM SERPL-SCNC: 4.6 MMOL/L (ref 3.5–5.2)
PROT SERPL-MCNC: 7.4 G/DL (ref 6–8.5)
SODIUM SERPL-SCNC: 143 MMOL/L (ref 134–144)
T3 SERPL-MCNC: 254 NG/DL (ref 71–180)
T4 FREE SERPL-MCNC: 1.14 NG/DL (ref 0.82–1.77)
TRIGL SERPL-MCNC: 201 MG/DL (ref 0–149)
TSH SERPL DL<=0.005 MIU/L-ACNC: 0.8 UIU/ML (ref 0.45–4.5)
VLDLC SERPL CALC-MCNC: 32 MG/DL (ref 5–40)

## 2023-05-12 DIAGNOSIS — E03.8 HYPOTHYROIDISM DUE TO HASHIMOTO'S THYROIDITIS: ICD-10-CM

## 2023-05-12 DIAGNOSIS — E78.5 HYPERLIPIDEMIA LDL GOAL <100: ICD-10-CM

## 2023-05-12 DIAGNOSIS — R73.02 IMPAIRED GLUCOSE TOLERANCE: ICD-10-CM

## 2023-05-12 DIAGNOSIS — E06.3 HYPOTHYROIDISM DUE TO HASHIMOTO'S THYROIDITIS: ICD-10-CM

## 2023-06-02 ENCOUNTER — OFFICE VISIT (OUTPATIENT)
Age: 63
End: 2023-06-02
Payer: COMMERCIAL

## 2023-06-02 VITALS
SYSTOLIC BLOOD PRESSURE: 120 MMHG | HEIGHT: 62 IN | BODY MASS INDEX: 29.74 KG/M2 | DIASTOLIC BLOOD PRESSURE: 64 MMHG | WEIGHT: 161.6 LBS | HEART RATE: 86 BPM

## 2023-06-02 DIAGNOSIS — E03.8 HYPOTHYROIDISM DUE TO HASHIMOTO'S THYROIDITIS: Primary | ICD-10-CM

## 2023-06-02 DIAGNOSIS — E06.3 HYPOTHYROIDISM DUE TO HASHIMOTO'S THYROIDITIS: Primary | ICD-10-CM

## 2023-06-02 DIAGNOSIS — R73.02 IMPAIRED GLUCOSE TOLERANCE (ORAL): ICD-10-CM

## 2023-06-02 DIAGNOSIS — E78.2 MIXED HYPERLIPIDEMIA: ICD-10-CM

## 2023-06-02 PROCEDURE — 99214 OFFICE O/P EST MOD 30 MIN: CPT | Performed by: INTERNAL MEDICINE

## 2023-06-02 RX ORDER — TRAZODONE HYDROCHLORIDE 50 MG/1
50 TABLET ORAL NIGHTLY
COMMUNITY

## 2023-06-02 RX ORDER — LEVOTHYROXINE, LIOTHYRONINE 57; 13.5 UG/1; UG/1
TABLET ORAL
COMMUNITY

## 2023-06-02 NOTE — PATIENT INSTRUCTIONS
1) Your TSH (thyroid test) is normal at 0.7 but I'm wondering if we slightly decrease your dose to 90 mg 1 tab on Mon-Sat and NONE on Sun if this will allow your TSH to rise in the 1-2 range, we'll see if you feel less hot and less hungry. 2) Hemoglobin A1c is a 3 month marker of your blood sugar control. Normal is less than 5.7% and diabetes is greater than 6.4%. Your Hemoglobin A1c is 6.1% which means your blood sugar is still in the borderline diabetic range and under slightly better control than last check when your value was 6.2%. Continue to work on your diet and exercise and take all your medications as directed. 3) BUN and creatinine are markers of kidney function. Your values are normal.    4) ALT and AST are markers of liver function. Your values are normal.    5) Your cholesterol is stable aside from the slightly high triglycerides (short term fats)    6) We'll still plan on repeating labs in 3 months.

## 2023-06-02 NOTE — PROGRESS NOTES
Chief Complaint   Patient presents with    Thyroid Problem     PCP and pharmacy confirmed      History of Present Illness: Riccardo Steiner is a 61 y.o. female here for follow up of thyroid. Weight up 1 lbs since last visit in 11/22. Compliant with NP thyroid 90 mg 1 tab on Mon-Sat and 1/2 tab on Sun and hasn't missed any doses as she uses a pill box for compliance. Takes this at 4:30 am and food and coffee around 7 am.  Still getting 4 tabs of metformin at night and may rarely miss a dose if she goes out at night. Getting the praluent every 2 weeks. Bowels are mostly regular. Overall energy is stable and not too much fatigue. No change in hair, skin or nails. Tends to still stay hot despite the dose decrease last time. No chest pain or palpitations or tremors. Current Outpatient Medications   Medication Sig    traZODone (DESYREL) 50 MG tablet Take 1 tablet by mouth nightly As needed    NP THYROID 90 MG tablet Take 1 tablet by mouth on Mon-Sat and 1/2 tab on Sun--BRAND MEDICALLY NECESSARY    Alirocumab (PRALUENT) 150 MG/ML SOAJ Once every 2 weeks. metFORMIN (GLUCOPHAGE-XR) 500 MG extended release tablet Take 4 tabs daily     No current facility-administered medications for this visit. Allergies   Allergen Reactions    Bee Venom Anaphylaxis    Statins Myalgia     With lovastatin and crestor and simvastatin    Ciprofloxacin Hives, Itching and Rash    Penicillins Hives, Itching and Rash    Sulfa Antibiotics Hives, Itching and Rash     Review of Systems: PER HPI    Physical Examination:  Blood pressure 120/64, pulse 86, height 5' 2\" (1.575 m), weight 161 lb 9.6 oz (73.3 kg).   General: pleasant, no distress, good eye contact   Neck: small amount of palpable thyroid tissue without nodules, no carotid bruits  Cardiovascular: regular, normal rate, nl s1 and s2, no m/r/g   Respiratory: clear to auscultation bilaterally   Integumentary: skin is normal, no edema  Neurological: reflexes 2+ at biceps, no

## 2023-06-27 RX ORDER — METFORMIN HYDROCHLORIDE 500 MG/1
TABLET, EXTENDED RELEASE ORAL
Qty: 360 TABLET | Refills: 3 | Status: SHIPPED | OUTPATIENT
Start: 2023-06-27

## 2023-08-01 ENCOUNTER — OFFICE VISIT (OUTPATIENT)
Age: 63
End: 2023-08-01

## 2023-08-01 VITALS
DIASTOLIC BLOOD PRESSURE: 70 MMHG | RESPIRATION RATE: 18 BRPM | BODY MASS INDEX: 29.81 KG/M2 | HEART RATE: 73 BPM | TEMPERATURE: 98.6 F | WEIGHT: 163 LBS | OXYGEN SATURATION: 96 % | SYSTOLIC BLOOD PRESSURE: 136 MMHG

## 2023-08-01 DIAGNOSIS — J06.9 VIRAL URI WITH COUGH: Primary | ICD-10-CM

## 2023-08-01 LAB
Lab: NORMAL
QC PASS/FAIL: NORMAL
SARS-COV-2, POC: NORMAL
STREP PYOGENES DNA, POC: NEGATIVE
VALID INTERNAL CONTROL, POC: YES

## 2023-08-01 RX ORDER — BENZONATATE 200 MG/1
200 CAPSULE ORAL 3 TIMES DAILY PRN
Qty: 30 CAPSULE | Refills: 0 | Status: SHIPPED | OUTPATIENT
Start: 2023-08-01 | End: 2023-08-08

## 2023-08-01 RX ORDER — DEXTROMETHORPHAN HYDROBROMIDE AND PROMETHAZINE HYDROCHLORIDE 15; 6.25 MG/5ML; MG/5ML
5 SYRUP ORAL
Qty: 118 ML | Refills: 0 | Status: SHIPPED | OUTPATIENT
Start: 2023-08-01

## 2023-08-31 LAB
ALBUMIN SERPL-MCNC: 4.5 G/DL (ref 3.9–4.9)
ALBUMIN/GLOB SERPL: 1.7 {RATIO} (ref 1.2–2.2)
ALP SERPL-CCNC: 99 IU/L (ref 44–121)
ALT SERPL-CCNC: 34 IU/L (ref 0–32)
AST SERPL-CCNC: 24 IU/L (ref 0–40)
BILIRUB SERPL-MCNC: 1.2 MG/DL (ref 0–1.2)
BUN SERPL-MCNC: 18 MG/DL (ref 8–27)
BUN/CREAT SERPL: 20 (ref 12–28)
CALCIUM SERPL-MCNC: 9.7 MG/DL (ref 8.7–10.3)
CHLORIDE SERPL-SCNC: 100 MMOL/L (ref 96–106)
CHOLEST SERPL-MCNC: 128 MG/DL (ref 100–199)
CO2 SERPL-SCNC: 25 MMOL/L (ref 20–29)
CREAT SERPL-MCNC: 0.9 MG/DL (ref 0.57–1)
EGFRCR SERPLBLD CKD-EPI 2021: 72 ML/MIN/1.73
GLOBULIN SER CALC-MCNC: 2.6 G/DL (ref 1.5–4.5)
GLUCOSE SERPL-MCNC: 101 MG/DL (ref 70–99)
HBA1C MFR BLD: 6.1 % (ref 4.8–5.6)
HDLC SERPL-MCNC: 40 MG/DL
IMP & REVIEW OF LAB RESULTS: NORMAL
LDLC SERPL CALC-MCNC: 49 MG/DL (ref 0–99)
POTASSIUM SERPL-SCNC: 4.5 MMOL/L (ref 3.5–5.2)
PROT SERPL-MCNC: 7.1 G/DL (ref 6–8.5)
SODIUM SERPL-SCNC: 139 MMOL/L (ref 134–144)
T3 SERPL-MCNC: 234 NG/DL (ref 71–180)
T4 FREE SERPL-MCNC: 1.02 NG/DL (ref 0.82–1.77)
TRIGL SERPL-MCNC: 245 MG/DL (ref 0–149)
TSH SERPL DL<=0.005 MIU/L-ACNC: 5.11 UIU/ML (ref 0.45–4.5)
VLDLC SERPL CALC-MCNC: 39 MG/DL (ref 5–40)

## 2023-09-02 DIAGNOSIS — R73.02 IMPAIRED GLUCOSE TOLERANCE (ORAL): ICD-10-CM

## 2023-09-02 DIAGNOSIS — E78.2 MIXED HYPERLIPIDEMIA: ICD-10-CM

## 2023-09-02 DIAGNOSIS — E03.8 HYPOTHYROIDISM DUE TO HASHIMOTO'S THYROIDITIS: ICD-10-CM

## 2023-09-02 DIAGNOSIS — E06.3 HYPOTHYROIDISM DUE TO HASHIMOTO'S THYROIDITIS: ICD-10-CM

## 2023-11-06 RX ORDER — THYROID 90 MG/1
TABLET ORAL
Qty: 90 TABLET | Refills: 3 | Status: SHIPPED | OUTPATIENT
Start: 2023-11-06

## 2023-11-14 ENCOUNTER — HOSPITAL ENCOUNTER (OUTPATIENT)
Facility: HOSPITAL | Age: 63
Discharge: HOME | End: 2023-11-17
Payer: COMMERCIAL

## 2023-11-14 VITALS — HEIGHT: 62 IN | BODY MASS INDEX: 29.81 KG/M2

## 2023-11-14 PROCEDURE — 97802 MEDICAL NUTRITION INDIV IN: CPT | Performed by: DIETITIAN, REGISTERED

## 2023-11-20 DIAGNOSIS — E06.3 HYPOTHYROIDISM DUE TO HASHIMOTO'S THYROIDITIS: ICD-10-CM

## 2023-11-20 DIAGNOSIS — R73.02 IMPAIRED GLUCOSE TOLERANCE (ORAL): ICD-10-CM

## 2023-11-20 DIAGNOSIS — E78.2 MIXED HYPERLIPIDEMIA: ICD-10-CM

## 2023-11-20 DIAGNOSIS — E03.8 HYPOTHYROIDISM DUE TO HASHIMOTO'S THYROIDITIS: ICD-10-CM

## 2023-11-21 ENCOUNTER — OFFICE VISIT (OUTPATIENT)
Age: 63
End: 2023-11-21

## 2023-11-21 VITALS
TEMPERATURE: 98.4 F | SYSTOLIC BLOOD PRESSURE: 117 MMHG | BODY MASS INDEX: 28.31 KG/M2 | OXYGEN SATURATION: 97 % | WEIGHT: 154.8 LBS | HEART RATE: 93 BPM | DIASTOLIC BLOOD PRESSURE: 61 MMHG

## 2023-11-21 DIAGNOSIS — R05.1 ACUTE COUGH: ICD-10-CM

## 2023-11-21 DIAGNOSIS — B96.89 ACUTE BACTERIAL SINUSITIS: Primary | ICD-10-CM

## 2023-11-21 DIAGNOSIS — J01.90 ACUTE BACTERIAL SINUSITIS: Primary | ICD-10-CM

## 2023-11-21 RX ORDER — BENZONATATE 200 MG/1
200 CAPSULE ORAL 3 TIMES DAILY PRN
Qty: 21 CAPSULE | Refills: 0 | Status: SHIPPED | OUTPATIENT
Start: 2023-11-21 | End: 2023-11-28

## 2023-11-21 RX ORDER — DOXYCYCLINE HYCLATE 100 MG
100 TABLET ORAL 2 TIMES DAILY
Qty: 14 TABLET | Refills: 0 | Status: SHIPPED | OUTPATIENT
Start: 2023-11-21 | End: 2023-11-28

## 2023-11-21 NOTE — PROGRESS NOTES
Subjective: (As above and below)     The patient/guardian gave verbal consent to treat. Chief Complaint   Patient presents with    Sinusitis     X more than 1week    Cough     Justin Pat is a 61 y.o. female who presents for evaluation of : sinus pain, pressure, nasal discharge and cough. Symptom onset > 1 week . Preceding illness: none. No other identified aggravating or alleviating factors. Symptoms are constant and overall worsening. Denies: SOB, vomiting/diarrhea, rashes, fevers . Review of Systems    Review of Systems - negative except as listed above    Reviewed PmHx, RxHx, FmHx, SocHx, AllgHx and updated in chart. Family History   Problem Relation Age of Onset    Pneumonia Father         and had c-diff    Thyroid Disease Mother     Thyroid Disease Sister        Past Medical History:   Diagnosis Date    Acquired hypothyroidism     due to hashimoto's: TPO ab 254 and TG ab 9.8 in     COVID-19 2021    High cholesterol     Sleep apnea     on CPAP      Social History     Socioeconomic History    Marital status:      Spouse name: None    Number of children: None    Years of education: None    Highest education level: None   Tobacco Use    Smoking status: Former     Packs/day: .5     Types: Cigarettes     Quit date: 2014     Years since quittin.3    Smokeless tobacco: Never   Substance and Sexual Activity    Alcohol use: Not Currently    Drug use: Never   Social History Narrative    Lives in [facility] with  from 2nd marriage. Has 3 sons from her 1st marriage. Works an  for [employer]. Likes to garden and spend time with her grandkids (has 6 of them by her family and 2 by her current 's).           Current Outpatient Medications   Medication Sig    doxycycline hyclate (VIBRA-TABS) 100 MG tablet Take 1 tablet by mouth 2 times daily for 7 days    benzonatate (TESSALON) 200 MG capsule Take 1 capsule by mouth 3 times daily as needed

## 2023-11-22 LAB
ALBUMIN SERPL-MCNC: 4.6 G/DL (ref 3.9–4.9)
ALBUMIN/GLOB SERPL: 1.8 {RATIO} (ref 1.2–2.2)
ALP SERPL-CCNC: 111 IU/L (ref 44–121)
ALT SERPL-CCNC: 20 IU/L (ref 0–32)
AST SERPL-CCNC: 19 IU/L (ref 0–40)
BILIRUB SERPL-MCNC: 1.1 MG/DL (ref 0–1.2)
BUN SERPL-MCNC: 13 MG/DL (ref 8–27)
BUN/CREAT SERPL: 14 (ref 12–28)
CALCIUM SERPL-MCNC: 9.9 MG/DL (ref 8.7–10.3)
CHLORIDE SERPL-SCNC: 99 MMOL/L (ref 96–106)
CHOLEST SERPL-MCNC: 115 MG/DL (ref 100–199)
CO2 SERPL-SCNC: 25 MMOL/L (ref 20–29)
CREAT SERPL-MCNC: 0.9 MG/DL (ref 0.57–1)
EGFRCR SERPLBLD CKD-EPI 2021: 72 ML/MIN/1.73
GLOBULIN SER CALC-MCNC: 2.5 G/DL (ref 1.5–4.5)
GLUCOSE SERPL-MCNC: 106 MG/DL (ref 70–99)
HBA1C MFR BLD: 6 % (ref 4.8–5.6)
HDLC SERPL-MCNC: 42 MG/DL
IMP & REVIEW OF LAB RESULTS: NORMAL
LDLC SERPL CALC-MCNC: 43 MG/DL (ref 0–99)
POTASSIUM SERPL-SCNC: 4.4 MMOL/L (ref 3.5–5.2)
PROT SERPL-MCNC: 7.1 G/DL (ref 6–8.5)
SODIUM SERPL-SCNC: 139 MMOL/L (ref 134–144)
T3 SERPL-MCNC: 225 NG/DL (ref 71–180)
T4 FREE SERPL-MCNC: 1.2 NG/DL (ref 0.82–1.77)
TRIGL SERPL-MCNC: 187 MG/DL (ref 0–149)
TSH SERPL DL<=0.005 MIU/L-ACNC: 0.56 UIU/ML (ref 0.45–4.5)
VLDLC SERPL CALC-MCNC: 30 MG/DL (ref 5–40)

## 2023-12-08 ENCOUNTER — OFFICE VISIT (OUTPATIENT)
Age: 63
End: 2023-12-08
Payer: COMMERCIAL

## 2023-12-08 VITALS
HEART RATE: 94 BPM | HEIGHT: 62 IN | DIASTOLIC BLOOD PRESSURE: 79 MMHG | WEIGHT: 153.8 LBS | SYSTOLIC BLOOD PRESSURE: 120 MMHG | BODY MASS INDEX: 28.3 KG/M2

## 2023-12-08 DIAGNOSIS — E78.2 MIXED HYPERLIPIDEMIA: ICD-10-CM

## 2023-12-08 DIAGNOSIS — E06.3 HYPOTHYROIDISM DUE TO HASHIMOTO'S THYROIDITIS: Primary | ICD-10-CM

## 2023-12-08 DIAGNOSIS — E03.8 HYPOTHYROIDISM DUE TO HASHIMOTO'S THYROIDITIS: Primary | ICD-10-CM

## 2023-12-08 DIAGNOSIS — R73.02 IMPAIRED GLUCOSE TOLERANCE (ORAL): ICD-10-CM

## 2023-12-08 PROCEDURE — 99214 OFFICE O/P EST MOD 30 MIN: CPT | Performed by: INTERNAL MEDICINE

## 2023-12-08 NOTE — PATIENT INSTRUCTIONS
1) Hemoglobin A1c is a 3 month marker of your blood sugar control. Normal is less than 5.7% and diabetes is greater than 6.4%. Your Hemoglobin A1c is 6% which means your blood sugar is still in the borderline diabetic range and under slightly better control than last check when your value was 6.1%. Continue to work on your diet and exercise and take all your medications as directed. Your weight is down 8 lbs on our scales. Keep up the good work! 2) BUN and creatinine are markers of kidney function. Your values are normal.    3) ALT and AST are markers of liver function. Your values are normal.    4) Your cholesterol has improved including your triglycerides (short term fats) with weight loss and less carb intake. 5) Your TSH (thyroid test) is perfect so I will keep your dose the same of NP thyroid 6.5 tabs/week. 6) Plan on repeating labs in 3 months. I will send you a message through [facility] with your lab results.

## 2023-12-08 NOTE — PROGRESS NOTES
thyroid bruits  Cardiovascular: regular, normal rate, nl s1 and s2, no m/r/g   Respiratory: clear to auscultation bilaterally   Integumentary: skin is normal, no edema  Neurological: reflexes 2+ at biceps, no tremors  Psychiatric: normal mood and affect    Data Reviewed:   Component      Latest Ref Rng 11/21/2023   Glucose, Random      70 - 99 mg/dL 106 (H)    BUN,BUNPL      8 - 27 mg/dL 13    Creatinine      0.57 - 1.00 mg/dL 0.90    Est, Glomerular Filtration Rate      >59 mL/min/1.73 72    BUN/Creatinine Ratio      12 - 28  14    Sodium      134 - 144 mmol/L 139    Potassium      3.5 - 5.2 mmol/L 4.4    Chloride      96 - 106 mmol/L 99    CO2      20 - 29 mmol/L 25    CALCIUM, SERUM, 859829      8.7 - 10.3 mg/dL 9.9    Total Protein      6.0 - 8.5 g/dL 7.1    Albumin      3.9 - 4.9 g/dL 4.6    Globulin, Total      1.5 - 4.5 g/dL 2.5    Albumin/Globulin Ratio      1.2 - 2.2  1.8    BILIRUBIN TOTAL      0.0 - 1.2 mg/dL 1.1    Alk Phos      44 - 121 IU/L 111    AST      0 - 40 IU/L 19    ALT      0 - 32 IU/L 20    Cholesterol, Total      100 - 199 mg/dL 115    Triglycerides      0 - 149 mg/dL 187 (H)    HDL Cholesterol      >39 mg/dL 42    VLDL Cholesterol Calculated      5 - 40 mg/dL 30    LDL Calculated      0 - 99 mg/dL 43    Hemoglobin A1C      4.8 - 5.6 % 6.0 (H)    T3, Total      71 - 180 ng/dL 225 (H)    TSH      0.450 - 4.500 uIU/mL 0.561    T4 Free      0.82 - 1.77 ng/dL 1.20      Assessment/Plan:     1. Acquired hypothyroidism to Hashimoto's: Was having fatigue and difficultly losing weight back in 2018 and was checked by her PCP and was found to have hypothyroidism.   Was started on levothyroxine and also took brand synthroid as initial treatments but continued to have fatigue on these and decided to see an endocrinologist and went to Dr. Mauro Butt in 2019 and was changed to unithroid initially but still had fatigue so asked to change to natural thyroid hormone and initially was on [facility] thyroid but this

## 2024-03-07 LAB
ALBUMIN SERPL-MCNC: 4.5 G/DL (ref 3.9–4.9)
ALBUMIN/GLOB SERPL: 1.9 {RATIO} (ref 1.2–2.2)
ALP SERPL-CCNC: 97 IU/L (ref 44–121)
ALT SERPL-CCNC: 16 IU/L (ref 0–32)
AST SERPL-CCNC: 17 IU/L (ref 0–40)
BILIRUB SERPL-MCNC: 0.7 MG/DL (ref 0–1.2)
BUN SERPL-MCNC: 22 MG/DL (ref 8–27)
BUN/CREAT SERPL: 28 (ref 12–28)
CALCIUM SERPL-MCNC: 9.7 MG/DL (ref 8.7–10.3)
CHLORIDE SERPL-SCNC: 100 MMOL/L (ref 96–106)
CHOLEST SERPL-MCNC: 205 MG/DL (ref 100–199)
CO2 SERPL-SCNC: 24 MMOL/L (ref 20–29)
CREAT SERPL-MCNC: 0.78 MG/DL (ref 0.57–1)
EGFRCR SERPLBLD CKD-EPI 2021: 85 ML/MIN/1.73
GLOBULIN SER CALC-MCNC: 2.4 G/DL (ref 1.5–4.5)
GLUCOSE SERPL-MCNC: 95 MG/DL (ref 70–99)
HBA1C MFR BLD: 6.3 % (ref 4.8–5.6)
HDLC SERPL-MCNC: 43 MG/DL
IMP & REVIEW OF LAB RESULTS: NORMAL
LDLC SERPL CALC-MCNC: 129 MG/DL (ref 0–99)
POTASSIUM SERPL-SCNC: 4.7 MMOL/L (ref 3.5–5.2)
PROT SERPL-MCNC: 6.9 G/DL (ref 6–8.5)
SODIUM SERPL-SCNC: 138 MMOL/L (ref 134–144)
T3 SERPL-MCNC: 206 NG/DL (ref 71–180)
T4 FREE SERPL-MCNC: 1.04 NG/DL (ref 0.82–1.77)
TRIGL SERPL-MCNC: 188 MG/DL (ref 0–149)
TSH SERPL DL<=0.005 MIU/L-ACNC: 1.58 UIU/ML (ref 0.45–4.5)
VLDLC SERPL CALC-MCNC: 33 MG/DL (ref 5–40)

## 2024-03-08 DIAGNOSIS — R73.02 IMPAIRED GLUCOSE TOLERANCE (ORAL): ICD-10-CM

## 2024-03-08 DIAGNOSIS — E78.2 MIXED HYPERLIPIDEMIA: ICD-10-CM

## 2024-03-08 DIAGNOSIS — E06.3 HYPOTHYROIDISM DUE TO HASHIMOTO'S THYROIDITIS: ICD-10-CM

## 2024-03-08 DIAGNOSIS — E03.8 HYPOTHYROIDISM DUE TO HASHIMOTO'S THYROIDITIS: ICD-10-CM

## 2024-03-08 RX ORDER — EVOLOCUMAB 140 MG/ML
INJECTION, SOLUTION SUBCUTANEOUS
COMMUNITY
Start: 2024-02-16

## 2024-05-23 LAB
ALBUMIN SERPL-MCNC: 4.4 G/DL (ref 3.9–4.9)
ALBUMIN/GLOB SERPL: 1.7 {RATIO} (ref 1.2–2.2)
ALP SERPL-CCNC: 112 IU/L (ref 44–121)
ALT SERPL-CCNC: 19 IU/L (ref 0–32)
AST SERPL-CCNC: 16 IU/L (ref 0–40)
BILIRUB SERPL-MCNC: 0.8 MG/DL (ref 0–1.2)
BUN SERPL-MCNC: 20 MG/DL (ref 8–27)
BUN/CREAT SERPL: 25 (ref 12–28)
CALCIUM SERPL-MCNC: 10.1 MG/DL (ref 8.7–10.3)
CHLORIDE SERPL-SCNC: 97 MMOL/L (ref 96–106)
CHOLEST SERPL-MCNC: 172 MG/DL (ref 100–199)
CO2 SERPL-SCNC: 26 MMOL/L (ref 20–29)
CREAT SERPL-MCNC: 0.8 MG/DL (ref 0.57–1)
EGFRCR SERPLBLD CKD-EPI 2021: 82 ML/MIN/1.73
GLOBULIN SER CALC-MCNC: 2.6 G/DL (ref 1.5–4.5)
GLUCOSE SERPL-MCNC: 98 MG/DL (ref 70–99)
HBA1C MFR BLD: 6.3 % (ref 4.8–5.6)
HDLC SERPL-MCNC: 47 MG/DL
IMP & REVIEW OF LAB RESULTS: NORMAL
LDLC SERPL CALC-MCNC: 96 MG/DL (ref 0–99)
POTASSIUM SERPL-SCNC: 4.4 MMOL/L (ref 3.5–5.2)
PROT SERPL-MCNC: 7 G/DL (ref 6–8.5)
SODIUM SERPL-SCNC: 139 MMOL/L (ref 134–144)
T3 SERPL-MCNC: 209 NG/DL (ref 71–180)
T4 FREE SERPL-MCNC: 1.05 NG/DL (ref 0.82–1.77)
TRIGL SERPL-MCNC: 166 MG/DL (ref 0–149)
TSH SERPL DL<=0.005 MIU/L-ACNC: 3.12 UIU/ML (ref 0.45–4.5)
VLDLC SERPL CALC-MCNC: 29 MG/DL (ref 5–40)

## 2024-05-24 DIAGNOSIS — E78.2 MIXED HYPERLIPIDEMIA: ICD-10-CM

## 2024-05-24 DIAGNOSIS — E03.8 HYPOTHYROIDISM DUE TO HASHIMOTO'S THYROIDITIS: ICD-10-CM

## 2024-05-24 DIAGNOSIS — E06.3 HYPOTHYROIDISM DUE TO HASHIMOTO'S THYROIDITIS: ICD-10-CM

## 2024-05-24 DIAGNOSIS — R73.02 IMPAIRED GLUCOSE TOLERANCE (ORAL): ICD-10-CM

## 2024-06-10 ENCOUNTER — OFFICE VISIT (OUTPATIENT)
Age: 64
End: 2024-06-10
Payer: COMMERCIAL

## 2024-06-10 VITALS
DIASTOLIC BLOOD PRESSURE: 69 MMHG | BODY MASS INDEX: 29.26 KG/M2 | HEART RATE: 94 BPM | WEIGHT: 159 LBS | SYSTOLIC BLOOD PRESSURE: 127 MMHG | HEIGHT: 62 IN

## 2024-06-10 DIAGNOSIS — E03.8 HYPOTHYROIDISM DUE TO HASHIMOTO'S THYROIDITIS: Primary | ICD-10-CM

## 2024-06-10 DIAGNOSIS — E78.2 MIXED HYPERLIPIDEMIA: ICD-10-CM

## 2024-06-10 DIAGNOSIS — E06.3 HYPOTHYROIDISM DUE TO HASHIMOTO'S THYROIDITIS: Primary | ICD-10-CM

## 2024-06-10 DIAGNOSIS — R73.02 IMPAIRED GLUCOSE TOLERANCE (ORAL): ICD-10-CM

## 2024-06-10 PROCEDURE — 99214 OFFICE O/P EST MOD 30 MIN: CPT | Performed by: INTERNAL MEDICINE

## 2024-06-10 RX ORDER — THYROID 90 MG/1
TABLET ORAL
Qty: 90 TABLET | Refills: 3 | Status: SHIPPED | OUTPATIENT
Start: 2024-06-10

## 2024-06-10 NOTE — PATIENT INSTRUCTIONS
1) TSH is a thyroid test.  Your level is 3.1 which is normal and at goal of 0.45-2.0.  The TSH goes opposite of your thyroid dose and suggests your dose of NP thyroid is not quite enough.  I will increase your dose to 1 tab 7 days a week and have sent a new prescription to your local pharmacy.  Tomorrow take 1.5 tabs to give a boost or take an extra 1/2 today and then starting Sunday take a whole tab.    2) BUN and creatinine are markers of kidney function.  Your values are normal.    3) ALT and AST are markers of liver function.  Your values are normal.    4) Your LDL (bad cholesterol) was better than in the spring when you were off praluent but this was drawn at the 14 day torie after your repatha dose so when you do your labs in 3 months, try to go 7 days after an injection.    5) Your Hemoglobin A1c (3 month test of blood sugar) is stable at 6.3% and still in the borderline diabetic range.    6) Try not to eat more than 45 grams of carbs per meal (1 palm/fist sized serving = 30 grams; carbs are potatoes, rice, pasta, bread, fruit, corn, peas, cereal, desserts)

## 2024-06-10 NOTE — PROGRESS NOTES
Respiratory: clear to auscultation bilaterally   Integumentary: skin is normal, no edema  Neurological: reflexes 2+ at biceps, no tremors  Psychiatric: normal mood and affect    Data Reviewed:   Component      Latest Ref Rng 5/22/2024   Glucose      70 - 99 mg/dL 98    BUN,BUNPL      8 - 27 mg/dL 20    Creatinine      0.57 - 1.00 mg/dL 0.80    Est, Glom Filt Rate      >59 mL/min/1.73 82    Bun/Cre      12 - 28  25    Sodium      134 - 144 mmol/L 139    Potassium      3.5 - 5.2 mmol/L 4.4    Chloride      96 - 106 mmol/L 97    CARBON DIOXIDE      20 - 29 mmol/L 26    Calcium      8.7 - 10.3 mg/dL 10.1    Total Protein      6.0 - 8.5 g/dL 7.0    Albumin      3.9 - 4.9 g/dL 4.4    Globulin, Total      1.5 - 4.5 g/dL 2.6    Albumin/Globulin Ratio      1.2 - 2.2  1.7    Total Bilirubin      0.0 - 1.2 mg/dL 0.8    Alkaline Phosphatase      44 - 121 IU/L 112    AST      0 - 40 IU/L 16    ALT      0 - 32 IU/L 19    Cholesterol, Total      100 - 199 mg/dL 172    Triglycerides      0 - 149 mg/dL 166 (H)    HDL Cholesterol      >39 mg/dL 47    VLDL      5 - 40 mg/dL 29    LDL Cholesterol      0 - 99 mg/dL 96    Hemoglobin A1C      4.8 - 5.6 % 6.3 (H)    T3, Total      71 - 180 ng/dL 209 (H)    TSH      0.450 - 4.500 uIU/mL 3.120    T4 Free      0.82 - 1.77 ng/dL 1.05         Assessment/Plan:     1. Acquired hypothyroidism to Hashimoto's: Was having fatigue and difficultly losing weight back in 2018 and was checked by her PCP and was found to have hypothyroidism.  Was started on levothyroxine and also took brand synthroid as initial treatments but continued to have fatigue on these and decided to see an endocrinologist and went to Dr. Mccabe in 2019 and was changed to unithroid initially but still had fatigue so asked to change to natural thyroid hormone and initially was on WP thyroid but this was recalled in 10/20 and then changed to NP thyroid at that time.  Had been taking 30 mg 1 tab 5x/week and 2 tabs 2x/week until

## 2024-06-11 RX ORDER — METFORMIN HYDROCHLORIDE 500 MG/1
TABLET, EXTENDED RELEASE ORAL
Qty: 360 TABLET | Refills: 3 | Status: SHIPPED | OUTPATIENT
Start: 2024-06-11

## 2024-09-10 DIAGNOSIS — R73.02 IMPAIRED GLUCOSE TOLERANCE (ORAL): ICD-10-CM

## 2024-09-10 DIAGNOSIS — E78.2 MIXED HYPERLIPIDEMIA: ICD-10-CM

## 2024-09-10 DIAGNOSIS — E03.8 HYPOTHYROIDISM DUE TO HASHIMOTO'S THYROIDITIS: ICD-10-CM

## 2024-09-10 DIAGNOSIS — E06.3 HYPOTHYROIDISM DUE TO HASHIMOTO'S THYROIDITIS: ICD-10-CM

## 2024-09-10 LAB
ALBUMIN SERPL-MCNC: 4.2 G/DL (ref 3.9–4.9)
ALP SERPL-CCNC: 100 IU/L (ref 44–121)
ALT SERPL-CCNC: 16 IU/L (ref 0–32)
AST SERPL-CCNC: 15 IU/L (ref 0–40)
BILIRUB SERPL-MCNC: 0.8 MG/DL (ref 0–1.2)
BUN SERPL-MCNC: 16 MG/DL (ref 8–27)
BUN/CREAT SERPL: 22 (ref 12–28)
CALCIUM SERPL-MCNC: 9.4 MG/DL (ref 8.7–10.3)
CHLORIDE SERPL-SCNC: 102 MMOL/L (ref 96–106)
CHOLEST SERPL-MCNC: 200 MG/DL (ref 100–199)
CO2 SERPL-SCNC: 23 MMOL/L (ref 20–29)
CREAT SERPL-MCNC: 0.72 MG/DL (ref 0.57–1)
EGFRCR SERPLBLD CKD-EPI 2021: 93 ML/MIN/1.73
GLOBULIN SER CALC-MCNC: 2.6 G/DL (ref 1.5–4.5)
GLUCOSE SERPL-MCNC: 97 MG/DL (ref 70–99)
HBA1C MFR BLD: 6.4 % (ref 4.8–5.6)
HDLC SERPL-MCNC: 33 MG/DL
IMP & REVIEW OF LAB RESULTS: NORMAL
LDLC SERPL CALC-MCNC: 119 MG/DL (ref 0–99)
POTASSIUM SERPL-SCNC: 4.4 MMOL/L (ref 3.5–5.2)
PROT SERPL-MCNC: 6.8 G/DL (ref 6–8.5)
SODIUM SERPL-SCNC: 141 MMOL/L (ref 134–144)
T3 SERPL-MCNC: 221 NG/DL (ref 71–180)
T4 FREE SERPL-MCNC: 1.16 NG/DL (ref 0.82–1.77)
TRIGL SERPL-MCNC: 273 MG/DL (ref 0–149)
TSH SERPL DL<=0.005 MIU/L-ACNC: 0.48 UIU/ML (ref 0.45–4.5)
VLDLC SERPL CALC-MCNC: 48 MG/DL (ref 5–40)

## 2024-12-01 DIAGNOSIS — R73.02 IMPAIRED GLUCOSE TOLERANCE (ORAL): ICD-10-CM

## 2024-12-01 DIAGNOSIS — E78.2 MIXED HYPERLIPIDEMIA: ICD-10-CM

## 2024-12-01 DIAGNOSIS — E06.3 HYPOTHYROIDISM DUE TO HASHIMOTO'S THYROIDITIS: ICD-10-CM

## 2024-12-04 LAB
ALBUMIN SERPL-MCNC: 4.5 G/DL (ref 3.9–4.9)
ALP SERPL-CCNC: 101 IU/L (ref 44–121)
ALT SERPL-CCNC: 18 IU/L (ref 0–32)
AST SERPL-CCNC: 20 IU/L (ref 0–40)
BILIRUB SERPL-MCNC: 0.7 MG/DL (ref 0–1.2)
BUN SERPL-MCNC: 15 MG/DL (ref 8–27)
BUN/CREAT SERPL: 16 (ref 12–28)
CALCIUM SERPL-MCNC: 10 MG/DL (ref 8.7–10.3)
CHLORIDE SERPL-SCNC: 101 MMOL/L (ref 96–106)
CHOLEST SERPL-MCNC: 173 MG/DL (ref 100–199)
CO2 SERPL-SCNC: 23 MMOL/L (ref 20–29)
CREAT SERPL-MCNC: 0.94 MG/DL (ref 0.57–1)
EGFRCR SERPLBLD CKD-EPI 2021: 68 ML/MIN/1.73
GLOBULIN SER CALC-MCNC: 3 G/DL (ref 1.5–4.5)
GLUCOSE SERPL-MCNC: 89 MG/DL (ref 70–99)
HBA1C MFR BLD: 6 % (ref 4.8–5.6)
HDLC SERPL-MCNC: 41 MG/DL
IMP & REVIEW OF LAB RESULTS: NORMAL
LDLC SERPL CALC-MCNC: 87 MG/DL (ref 0–99)
POTASSIUM SERPL-SCNC: 4.7 MMOL/L (ref 3.5–5.2)
PROT SERPL-MCNC: 7.5 G/DL (ref 6–8.5)
SODIUM SERPL-SCNC: 141 MMOL/L (ref 134–144)
T3 SERPL-MCNC: 204 NG/DL (ref 71–180)
T4 FREE SERPL-MCNC: 1.14 NG/DL (ref 0.82–1.77)
TRIGL SERPL-MCNC: 271 MG/DL (ref 0–149)
TSH SERPL DL<=0.005 MIU/L-ACNC: 1.06 UIU/ML (ref 0.45–4.5)
VLDLC SERPL CALC-MCNC: 45 MG/DL (ref 5–40)

## 2024-12-10 ENCOUNTER — OFFICE VISIT (OUTPATIENT)
Age: 64
End: 2024-12-10
Payer: COMMERCIAL

## 2024-12-10 VITALS
HEART RATE: 100 BPM | BODY MASS INDEX: 29.37 KG/M2 | SYSTOLIC BLOOD PRESSURE: 133 MMHG | HEIGHT: 62 IN | WEIGHT: 159.6 LBS | DIASTOLIC BLOOD PRESSURE: 64 MMHG

## 2024-12-10 DIAGNOSIS — E66.3 OVERWEIGHT: ICD-10-CM

## 2024-12-10 DIAGNOSIS — R73.02 IMPAIRED GLUCOSE TOLERANCE (ORAL): ICD-10-CM

## 2024-12-10 DIAGNOSIS — E78.2 MIXED HYPERLIPIDEMIA: ICD-10-CM

## 2024-12-10 DIAGNOSIS — E06.3 HYPOTHYROIDISM DUE TO HASHIMOTO'S THYROIDITIS: Primary | ICD-10-CM

## 2024-12-10 PROCEDURE — 99214 OFFICE O/P EST MOD 30 MIN: CPT | Performed by: INTERNAL MEDICINE

## 2024-12-10 RX ORDER — HYDROXYCHLOROQUINE SULFATE 200 MG/1
200 TABLET, FILM COATED ORAL 2 TIMES DAILY
COMMUNITY
Start: 2024-12-03

## 2024-12-10 RX ORDER — ALIROCUMAB 150 MG/ML
150 INJECTION, SOLUTION SUBCUTANEOUS
COMMUNITY
Start: 2024-11-18

## 2024-12-10 NOTE — PROGRESS NOTES
Chief Complaint   Patient presents with    Thyroid Problem     PCP and pharmacy confirmed     History of Present Illness: Juanita Saxena is a 64 y.o. female here for follow up of thyroid.  Weight stable since last visit in 6/24.  Saw Dr. Baxter for evaluation of joint pains and was found to have mixed connective tissue disease and was started on  mg bid and her pains are improving but still has to take tylenol on occasion.  Did end up stopping the repatha this fall and has been back on praluent for the past 2 months.  Taking the NP thyroid 90 mg daily.  Has had more frequent bms since starting HCQ.  Still taking metformin 4 tabs daily.  If she eats gluten then this will also make her bowels looser.  Tends to stay warm but unchanged.  No hair loss or brittle nails and has used some collagen powder that helps.  Willing to try intermittent fasting.      Current Outpatient Medications   Medication Sig    PRALUENT 150 MG/ML SOAJ Inject 150 mg into the skin every 14 days    hydroxychloroquine (PLAQUENIL) 200 MG tablet 1 tablet 2 times daily    metFORMIN (GLUCOPHAGE-XR) 500 MG extended release tablet TAKE FOUR TABLETS BY MOUTH ONE TIME DAILY    thyroid (NP THYROID) 90 MG tablet Take 1 tab daily     No current facility-administered medications for this visit.     Allergies   Allergen Reactions    Bee Venom Anaphylaxis    Pantoprazole Other (See Comments)     Joint pains that resolved with stopping this    Statins Myalgia     With lovastatin and crestor and simvastatin    Ciprofloxacin Hives, Itching and Rash    Penicillins Hives, Itching and Rash    Sulfa Antibiotics Hives, Itching and Rash       Review of Systems: PER HPI    Physical Examination:  Blood pressure 133/64, pulse 100, height 1.575 m (5' 2\"), weight 72.4 kg (159 lb 9.6 oz).  General: pleasant, no distress, good eye contact   Neck: no carotid bruits  Cardiovascular: regular, normal rate, nl s1 and s2, no m/r/g   Respiratory: clear to auscultation

## 2024-12-10 NOTE — PATIENT INSTRUCTIONS
1) Your TSH (thyroid test) is perfect so I will keep your dose the same of NP thyroid.  Although your T3 level is high, this is common while on NP thyroid so I'm not concerned by this finding as this pill has more T3 in it.    2) Your total and LDL (bad cholesterol) are better back on the praluent.    3) BUN and creatinine are markers of kidney function.  Your values are normal.    4) ALT and AST are markers of liver function.  Your values are normal.    5) Hemoglobin A1c is a 3 month marker of your blood sugar control.  Normal is less than 5.7% and diabetes is greater than 6.4%.  Your Hemoglobin A1c is 6% which means your blood sugar is still in the borderline diabetic range and under better control than last check when your value was 6.4%.  Continue to work on your diet and exercise and take all your medications as directed.    6) The key to losing weight is less circulating insulin as the more insulin that circulates in your blood, the harder it is to burn belly fat.  Every time you eat or drink anything with sugar, your pancreas produces more insulin and this will lead to more difficulty losing weight so the more time you spend in a fasting state, the better you will be able to lose weight and when you do eat, you want to limit your carbohydrate intake as best as possible.    Try intermittent fasting where you eat a meal at noon and another one at 6pm and then don't eat any food for 18 hours (or pick another 6 hour window that works for you).  If you get hungry instead of eating, try drinking 8-12 oz of water as often your brain cannot tell the difference between hunger and thirst.    If you absolutely can't resist your hunger, then only have protein like a slice of cheese or deli meat or handful of almonds or 1 teaspoon of peanut butter or a hard boiled egg or try sugar free jello or pudding or raw veggies.    Try not to eat more than 45 grams of carbs for your 2 meals (1 palm/fist sized serving = 30 grams;

## 2025-03-10 DIAGNOSIS — R73.02 IMPAIRED GLUCOSE TOLERANCE (ORAL): ICD-10-CM

## 2025-03-10 DIAGNOSIS — E78.2 MIXED HYPERLIPIDEMIA: ICD-10-CM

## 2025-03-10 DIAGNOSIS — E06.3 HYPOTHYROIDISM DUE TO HASHIMOTO'S THYROIDITIS: ICD-10-CM

## 2025-05-22 LAB — HBA1C MFR BLD: 5.7 % (ref 4.8–5.6)

## 2025-05-23 LAB
ALBUMIN SERPL-MCNC: 4.9 G/DL (ref 3.9–4.9)
ALP SERPL-CCNC: 92 IU/L (ref 44–121)
ALT SERPL-CCNC: 18 IU/L (ref 0–32)
AST SERPL-CCNC: 21 IU/L (ref 0–40)
BILIRUB SERPL-MCNC: 0.9 MG/DL (ref 0–1.2)
BUN SERPL-MCNC: 15 MG/DL (ref 8–27)
BUN/CREAT SERPL: 16 (ref 12–28)
CALCIUM SERPL-MCNC: 9.7 MG/DL (ref 8.7–10.3)
CHLORIDE SERPL-SCNC: 100 MMOL/L (ref 96–106)
CHOLEST SERPL-MCNC: 138 MG/DL (ref 100–199)
CO2 SERPL-SCNC: 23 MMOL/L (ref 20–29)
CREAT SERPL-MCNC: 0.93 MG/DL (ref 0.57–1)
EGFRCR SERPLBLD CKD-EPI 2021: 68 ML/MIN/1.73
GLOBULIN SER CALC-MCNC: 2.2 G/DL (ref 1.5–4.5)
GLUCOSE SERPL-MCNC: 93 MG/DL (ref 70–99)
HDLC SERPL-MCNC: 48 MG/DL
IMP & REVIEW OF LAB RESULTS: NORMAL
LDLC SERPL CALC-MCNC: 53 MG/DL (ref 0–99)
POTASSIUM SERPL-SCNC: 4.6 MMOL/L (ref 3.5–5.2)
PROT SERPL-MCNC: 7.1 G/DL (ref 6–8.5)
SODIUM SERPL-SCNC: 139 MMOL/L (ref 134–144)
T3 SERPL-MCNC: 223 NG/DL (ref 71–180)
T4 FREE SERPL-MCNC: 1.08 NG/DL (ref 0.82–1.77)
TRIGL SERPL-MCNC: 230 MG/DL (ref 0–149)
TSH SERPL DL<=0.005 MIU/L-ACNC: 1.4 UIU/ML (ref 0.45–4.5)
VLDLC SERPL CALC-MCNC: 37 MG/DL (ref 5–40)

## 2025-05-24 DIAGNOSIS — E06.3 HYPOTHYROIDISM DUE TO HASHIMOTO'S THYROIDITIS: ICD-10-CM

## 2025-05-24 DIAGNOSIS — E78.2 MIXED HYPERLIPIDEMIA: ICD-10-CM

## 2025-05-24 DIAGNOSIS — R73.02 IMPAIRED GLUCOSE TOLERANCE (ORAL): ICD-10-CM

## 2025-06-10 ENCOUNTER — OFFICE VISIT (OUTPATIENT)
Age: 65
End: 2025-06-10
Payer: COMMERCIAL

## 2025-06-10 ENCOUNTER — RESULTS FOLLOW-UP (OUTPATIENT)
Age: 65
End: 2025-06-10

## 2025-06-10 VITALS
SYSTOLIC BLOOD PRESSURE: 139 MMHG | DIASTOLIC BLOOD PRESSURE: 80 MMHG | RESPIRATION RATE: 16 BRPM | HEART RATE: 86 BPM | BODY MASS INDEX: 28.71 KG/M2 | OXYGEN SATURATION: 97 % | WEIGHT: 156 LBS | HEIGHT: 62 IN

## 2025-06-10 DIAGNOSIS — E78.2 MIXED HYPERLIPIDEMIA: ICD-10-CM

## 2025-06-10 DIAGNOSIS — E06.3 HYPOTHYROIDISM DUE TO HASHIMOTO'S THYROIDITIS: Primary | ICD-10-CM

## 2025-06-10 DIAGNOSIS — E66.3 OVERWEIGHT: ICD-10-CM

## 2025-06-10 DIAGNOSIS — R73.02 IMPAIRED GLUCOSE TOLERANCE (ORAL): ICD-10-CM

## 2025-06-10 PROCEDURE — 1123F ACP DISCUSS/DSCN MKR DOCD: CPT | Performed by: INTERNAL MEDICINE

## 2025-06-10 PROCEDURE — 99214 OFFICE O/P EST MOD 30 MIN: CPT | Performed by: INTERNAL MEDICINE

## 2025-06-10 RX ORDER — EPINEPHRINE 0.3 MG/.3ML
INJECTION SUBCUTANEOUS
COMMUNITY
Start: 2025-03-11

## 2025-06-10 RX ORDER — CLOBETASOL PROPIONATE 0.5 MG/G
OINTMENT TOPICAL
COMMUNITY
Start: 2025-05-19

## 2025-06-10 RX ORDER — ESTRADIOL 0.1 MG/G
CREAM VAGINAL
COMMUNITY
Start: 2025-05-19

## 2025-06-10 NOTE — PROGRESS NOTES
Identified pt with two pt identifiers(name and ). Reviewed record in preparation for visit and have obtained necessary documentation. All patient medications has been reviewed.  Chief Complaint   Patient presents with    Follow-up     Nt Consent to Freedom    Thyroid Problem           Wt Readings from Last 3 Encounters:   06/10/25 70.8 kg (156 lb)   12/10/24 72.4 kg (159 lb 9.6 oz)   06/10/24 72.1 kg (159 lb)     Temp Readings from Last 3 Encounters:   23 98.4 °F (36.9 °C) (Oral)   23 98.6 °F (37 °C) (Temporal)     BP Readings from Last 3 Encounters:   06/10/25 139/80   12/10/24 133/64   06/10/24 127/69     Pulse Readings from Last 3 Encounters:   06/10/25 86   12/10/24 100   06/10/24 94       Have you been to the ER, urgent care clinic since your last visit?  Hospitalized since your last visit?   NO    Have you seen or consulted any other health care providers outside our system since your last visit?   NO

## 2025-06-10 NOTE — PATIENT INSTRUCTIONS
1) Your TSH (thyroid test) is perfect so I will keep your dose the same of NP thyroid.    2) Hemoglobin A1c is a 3 month marker of your blood sugar control.  Normal is less than 5.7% and diabetes is greater than 6.4%.  Your Hemoglobin A1c is 5.7% which means your blood sugar is still in the borderline diabetic range and under better control than last check when your value was 6.1%.  Continue to work on your diet and exercise and take all your medications as directed.    3) BUN and creatinine are markers of kidney function.  Your values are normal.    4) ALT and AST are markers of liver function.  Your values are normal.    5) Your cholesterol has improved and wt is down 3 lbs.    6) Please repeat your labs in 3 months.   I will send you a message through [facility] with your lab results.

## 2025-06-10 NOTE — PROGRESS NOTES
Chief Complaint   Patient presents with    Follow-up     Consents to Freedom    Thyroid Problem     History of Present Illness: Juanita Saxena is a 65 y.o. female here for follow up of thyroid.  Weight down 3 lbs since last visit in 12/24.      History of Present Illness  The patient is a 65-year-old female here for a follow-up of thyroid, cholesterol, blood glucose, and joint pain.    She underwent her annual checkup with her primary care physician in 03/2025, during which lab tests were conducted. She states her A1c was 5.7% at that time. She has been adhering to an intermittent fasting regimen, consuming her last meal at 5 PM and refraining from eating until 9 AM the following day. She is currently on a daily dose of NP Thyroid 90 mg.    She has resumed her Praluent 150 mg every 14 days. Initially, she discontinued this medication due to suspected side effects but later realized that the symptoms were not related to the injection. She was subsequently prescribed Repatha but requested to return to Praluent.    She has been mindful of her diet and is currently taking four metformin tablets daily.     She reports an improvement in her overall energy levels over the past six months, particularly with a reduction in joint pain. She has been able to reduce her Tylenol and ibuprofen intake to once at bedtime, which has enabled her to increase her physical activity. She reports no new tremors. Her bowel movements are irregular, alternating between loose stools and constipation. She suspects that her hair loss may be a side effect of hydroxychloroquine. She does not experience any temperature discomfort. She continues to take Tylenol and ibuprofen before bed to prevent waking up in pain. She was evaluated by Dr. Baxter for joint pains and was found to have mixed connective tissue disease. She was started on hydroxychloroquine (Plaquenil) 200 mg twice a day, which has significantly improved her condition.      Current

## 2025-06-13 RX ORDER — LEVOTHYROXINE, LIOTHYRONINE 57; 13.5 UG/1; UG/1
90 TABLET ORAL DAILY
Qty: 90 TABLET | Refills: 3 | Status: SHIPPED | OUTPATIENT
Start: 2025-06-13

## 2025-06-13 RX ORDER — METFORMIN HYDROCHLORIDE 500 MG/1
TABLET, EXTENDED RELEASE ORAL
Qty: 360 TABLET | Refills: 3 | Status: SHIPPED | OUTPATIENT
Start: 2025-06-13